# Patient Record
Sex: FEMALE | Race: WHITE
[De-identification: names, ages, dates, MRNs, and addresses within clinical notes are randomized per-mention and may not be internally consistent; named-entity substitution may affect disease eponyms.]

---

## 2017-04-06 ENCOUNTER — HOSPITAL (OUTPATIENT)
Dept: OTHER | Age: 82
End: 2017-04-06
Attending: OPHTHALMOLOGY

## 2017-04-20 ENCOUNTER — HOSPITAL (OUTPATIENT)
Dept: OTHER | Age: 82
End: 2017-04-20
Attending: OPHTHALMOLOGY

## 2021-05-17 ENCOUNTER — OFFICE VISIT (OUTPATIENT)
Dept: INTERNAL MEDICINE CLINIC | Facility: CLINIC | Age: 86
End: 2021-05-17
Payer: COMMERCIAL

## 2021-05-17 VITALS
SYSTOLIC BLOOD PRESSURE: 152 MMHG | DIASTOLIC BLOOD PRESSURE: 85 MMHG | BODY MASS INDEX: 29.63 KG/M2 | RESPIRATION RATE: 17 BRPM | WEIGHT: 161 LBS | HEIGHT: 62 IN | HEART RATE: 79 BPM

## 2021-05-17 DIAGNOSIS — E87.6 HYPOKALEMIA: Primary | ICD-10-CM

## 2021-05-17 DIAGNOSIS — R41.3 MEMORY IMPAIRMENT: ICD-10-CM

## 2021-05-17 PROBLEM — I10 ESSENTIAL HYPERTENSION: Status: ACTIVE | Noted: 2021-05-17

## 2021-05-17 PROCEDURE — 99203 OFFICE O/P NEW LOW 30 MIN: CPT | Performed by: INTERNAL MEDICINE

## 2021-05-17 PROCEDURE — 3077F SYST BP >= 140 MM HG: CPT | Performed by: INTERNAL MEDICINE

## 2021-05-17 PROCEDURE — 3079F DIAST BP 80-89 MM HG: CPT | Performed by: INTERNAL MEDICINE

## 2021-05-17 PROCEDURE — 3008F BODY MASS INDEX DOCD: CPT | Performed by: INTERNAL MEDICINE

## 2021-05-17 RX ORDER — CHLORHEXIDINE GLUCONATE 0.12 MG/ML
RINSE ORAL 2 TIMES DAILY
COMMUNITY
Start: 2021-05-11 | End: 2021-06-28

## 2021-05-17 RX ORDER — MELOXICAM 15 MG/1
TABLET ORAL
COMMUNITY
Start: 2018-10-26 | End: 2021-06-28

## 2021-05-17 RX ORDER — TRIAMTERENE AND HYDROCHLOROTHIAZIDE 37.5; 25 MG/1; MG/1
1 TABLET ORAL DAILY
COMMUNITY
Start: 2018-10-22 | End: 2021-07-14

## 2021-05-17 RX ORDER — PENICILLIN V POTASSIUM 500 MG/1
500 TABLET ORAL 4 TIMES DAILY
COMMUNITY
Start: 2021-05-11 | End: 2021-06-28

## 2021-05-18 ENCOUNTER — LAB ENCOUNTER (OUTPATIENT)
Dept: LAB | Age: 86
End: 2021-05-18
Attending: INTERNAL MEDICINE
Payer: MEDICARE

## 2021-05-18 DIAGNOSIS — E87.6 HYPOKALEMIA: ICD-10-CM

## 2021-05-18 DIAGNOSIS — R41.3 MEMORY IMPAIRMENT: ICD-10-CM

## 2021-05-18 PROCEDURE — 80053 COMPREHEN METABOLIC PANEL: CPT

## 2021-05-18 PROCEDURE — 36415 COLL VENOUS BLD VENIPUNCTURE: CPT

## 2021-05-18 PROCEDURE — 82607 VITAMIN B-12: CPT

## 2021-05-18 PROCEDURE — 84443 ASSAY THYROID STIM HORMONE: CPT

## 2021-05-18 NOTE — PROGRESS NOTES
HPI/Subjective:   Patient ID: Bonifacio Chu is a 80year old female. Resents to establish care, follow-up on hypertension    HPI  Patient moved to [facility] closer to her daughters.   She lives independently in [place], able to take care of yourself, gino Extraocular Movements: Extraocular movements intact. Conjunctiva/sclera: Conjunctivae normal.      Pupils: Pupils are equal, round, and reactive to light. Cardiovascular:      Rate and Rhythm: Normal rate and regular rhythm.       Heart sounds: No mu

## 2021-06-02 ENCOUNTER — LAB ENCOUNTER (OUTPATIENT)
Dept: LAB | Age: 86
End: 2021-06-02
Attending: INTERNAL MEDICINE
Payer: MEDICARE

## 2021-06-02 DIAGNOSIS — E87.1 HYPONATREMIA: ICD-10-CM

## 2021-06-02 PROCEDURE — 80048 BASIC METABOLIC PNL TOTAL CA: CPT

## 2021-06-02 PROCEDURE — 36415 COLL VENOUS BLD VENIPUNCTURE: CPT

## 2021-06-17 ENCOUNTER — LAB ENCOUNTER (OUTPATIENT)
Dept: LAB | Age: 86
End: 2021-06-17
Attending: INTERNAL MEDICINE
Payer: MEDICARE

## 2021-06-17 DIAGNOSIS — R89.9 ABNORMAL LABORATORY TEST RESULT: ICD-10-CM

## 2021-06-17 PROCEDURE — 36415 COLL VENOUS BLD VENIPUNCTURE: CPT

## 2021-06-17 PROCEDURE — 80048 BASIC METABOLIC PNL TOTAL CA: CPT

## 2021-06-23 ENCOUNTER — HOSPITAL ENCOUNTER (OUTPATIENT)
Dept: ULTRASOUND IMAGING | Age: 86
Discharge: HOME | End: 2021-06-23
Attending: INTERNAL MEDICINE
Payer: MEDICARE

## 2021-06-23 DIAGNOSIS — R79.89 ELEVATED SERUM CREATININE: ICD-10-CM

## 2021-06-23 PROCEDURE — 76775 US EXAM ABDO BACK WALL LIM: CPT | Performed by: INTERNAL MEDICINE

## 2021-06-28 ENCOUNTER — OFFICE VISIT (OUTPATIENT)
Dept: INTERNAL MEDICINE CLINIC | Facility: CLINIC | Age: 86
End: 2021-06-28
Payer: COMMERCIAL

## 2021-06-28 VITALS
DIASTOLIC BLOOD PRESSURE: 87 MMHG | HEART RATE: 76 BPM | BODY MASS INDEX: 28.52 KG/M2 | HEIGHT: 62 IN | WEIGHT: 155 LBS | SYSTOLIC BLOOD PRESSURE: 159 MMHG

## 2021-06-28 DIAGNOSIS — I10 ESSENTIAL HYPERTENSION: ICD-10-CM

## 2021-06-28 DIAGNOSIS — R41.3 MEMORY IMPAIRMENT: ICD-10-CM

## 2021-06-28 DIAGNOSIS — R19.00 PELVIC MASS IN FEMALE: ICD-10-CM

## 2021-06-28 DIAGNOSIS — Z00.00 PHYSICAL EXAM, ANNUAL: Primary | ICD-10-CM

## 2021-06-28 DIAGNOSIS — Z96.611 HISTORY OF ARTHROPLASTY OF RIGHT SHOULDER: ICD-10-CM

## 2021-06-28 DIAGNOSIS — I70.0 ATHEROSCLEROSIS OF AORTA (HCC): ICD-10-CM

## 2021-06-28 PROCEDURE — 3077F SYST BP >= 140 MM HG: CPT | Performed by: INTERNAL MEDICINE

## 2021-06-28 PROCEDURE — 99397 PER PM REEVAL EST PAT 65+ YR: CPT | Performed by: INTERNAL MEDICINE

## 2021-06-28 PROCEDURE — 96160 PT-FOCUSED HLTH RISK ASSMT: CPT | Performed by: INTERNAL MEDICINE

## 2021-06-28 PROCEDURE — G0439 PPPS, SUBSEQ VISIT: HCPCS | Performed by: INTERNAL MEDICINE

## 2021-06-28 PROCEDURE — 3079F DIAST BP 80-89 MM HG: CPT | Performed by: INTERNAL MEDICINE

## 2021-06-28 PROCEDURE — 3008F BODY MASS INDEX DOCD: CPT | Performed by: INTERNAL MEDICINE

## 2021-06-28 RX ORDER — METOPROLOL SUCCINATE 25 MG/1
25 TABLET, EXTENDED RELEASE ORAL DAILY
Qty: 90 TABLET | Refills: 0 | Status: SHIPPED | OUTPATIENT
Start: 2021-06-28 | End: 2021-09-22

## 2021-06-30 ENCOUNTER — TELEPHONE (OUTPATIENT)
Dept: INTERNAL MEDICINE CLINIC | Facility: CLINIC | Age: 86
End: 2021-06-30

## 2021-06-30 NOTE — TELEPHONE ENCOUNTER
Pt's daughter, Frank Weston (PAUL in place) states doctor felt a mass in pt's pelvic area when she was seen in the office on Monday 6/28/21 and a pelvic ultrasound was ordered.  Frank Weston states the earliest appt available for the ultrasound at any location was July 21st

## 2021-06-30 NOTE — TELEPHONE ENCOUNTER
Left message for the daughter that they can try to call scheduling and see if a new location for [facility] have openings for pelvic ultrasound to speed up testing

## 2021-07-02 NOTE — PROGRESS NOTES
HPI:   Niall Suggs is a 80year old female who presents for a MA (Medicare Advantage) [facility] (Once per calendar year). Reports that she has been feeling fair, she lives by herself able to take care of her needs, family visits her.   Patient is for AST 18 05/18/2021    ALT 17 05/18/2021    CA 9.7 06/17/2021    ALB 3.6 05/18/2021    TSH 0.766 05/18/2021    CREATSERUM 0.84 06/17/2021    GLU 97 06/17/2021        CBC  (most recent labs)   No results found for: WBC, HGB, PLT     ALLERGIES:   She is all Normal breath sounds. No wheezing or rhonchi. Abdominal:      General: Abdomen is flat. Palpations: Abdomen is soft. There is mass. Tenderness: There is no guarding or rebound.       Comments: Suprapubic mass size of the fist   Musculoskeletal: and exercise.     Follow-up in 2 weeks for blood pressure recheck    Marty Jernigan MD, 7/2/2021     General Health     In the past six months, have you lost more than 10 pounds without trying?: 2 - No  Has your appetite been poor?: No  How would you describ Sigmoidoscopy   Covered every 4 years -    Fecal Occult Blood Test Covered annually -   Bone Density Screening    Bone density screening    Covered every 2 years after age 72 if diagnosed with risk of osteoporosis or estrogen deficiency.     Covered yearly Drug Serum Conc Annually No results found for: DIGOXIN, DIG, VALP

## 2021-07-14 ENCOUNTER — PATIENT MESSAGE (OUTPATIENT)
Dept: INTERNAL MEDICINE CLINIC | Facility: CLINIC | Age: 86
End: 2021-07-14

## 2021-07-14 RX ORDER — TRIAMTERENE AND HYDROCHLOROTHIAZIDE 37.5; 25 MG/1; MG/1
1 TABLET ORAL DAILY
Qty: 90 TABLET | Refills: 1 | Status: SHIPPED | OUTPATIENT
Start: 2021-07-14 | End: 2021-09-23

## 2021-07-15 NOTE — TELEPHONE ENCOUNTER
From: Nayan Wen  To: Nayan Rand MD  Sent: 7/14/2021 6:42 PM CDT  Subject: Prescription Question    Will you please send a refill of my triamterene HCTZ to the [facility] on [address] in [facility].  Thank you

## 2021-07-21 ENCOUNTER — HOSPITAL ENCOUNTER (OUTPATIENT)
Dept: ULTRASOUND IMAGING | Age: 86
Discharge: HOME | End: 2021-07-21
Attending: INTERNAL MEDICINE
Payer: MEDICARE

## 2021-07-21 DIAGNOSIS — R19.00 PELVIC MASS IN FEMALE: ICD-10-CM

## 2021-07-21 PROCEDURE — 76856 US EXAM PELVIC COMPLETE: CPT | Performed by: INTERNAL MEDICINE

## 2021-07-21 PROCEDURE — 76830 TRANSVAGINAL US NON-OB: CPT | Performed by: INTERNAL MEDICINE

## 2021-07-31 ENCOUNTER — PATIENT MESSAGE (OUTPATIENT)
Dept: INTERNAL MEDICINE CLINIC | Facility: CLINIC | Age: 86
End: 2021-07-31

## 2021-08-01 ENCOUNTER — TELEPHONE (OUTPATIENT)
Dept: INTERNAL MEDICINE CLINIC | Facility: CLINIC | Age: 86
End: 2021-08-01

## 2021-08-05 ENCOUNTER — TELEPHONE (OUTPATIENT)
Dept: INTERNAL MEDICINE CLINIC | Facility: CLINIC | Age: 86
End: 2021-08-05

## 2021-08-05 NOTE — TELEPHONE ENCOUNTER
Background, Mychart 8/5/2021 4:31 PM T    [phone]. Thank you    ----- Message -----  From: Debi Rodríguez MD  Sent: 8/1/21 17:14  To: Carla Littlejohn  Subject: RE: Test Results Question    I would like to speak to you regarding next step.  Please give

## 2021-08-06 NOTE — TELEPHONE ENCOUNTER
Please approve for patient CT pelvis with contrast, pelvic mass palpated on exam in the office, negative pelvic ultrasound

## 2021-08-06 NOTE — TELEPHONE ENCOUNTER
Spoke to daughter, advised that I recommend CT pelvis will place referral requires approval from insurance most likely, will wait for approval

## 2021-08-20 ENCOUNTER — HOSPITAL ENCOUNTER (OUTPATIENT)
Dept: CT IMAGING | Age: 86
Discharge: HOME | End: 2021-08-20
Attending: INTERNAL MEDICINE
Payer: MEDICARE

## 2021-08-20 DIAGNOSIS — R19.00 PELVIC MASS: ICD-10-CM

## 2021-08-20 LAB — CREAT BLD-MCNC: 1 MG/DL

## 2021-08-20 PROCEDURE — 82565 ASSAY OF CREATININE: CPT

## 2021-08-20 PROCEDURE — 72193 CT PELVIS W/DYE: CPT | Performed by: INTERNAL MEDICINE

## 2021-09-16 ENCOUNTER — OFFICE VISIT (OUTPATIENT)
Dept: ORTHOPEDICS CLINIC | Facility: CLINIC | Age: 86
End: 2021-09-16
Payer: COMMERCIAL

## 2021-09-16 ENCOUNTER — HOSPITAL ENCOUNTER (OUTPATIENT)
Dept: GENERAL RADIOLOGY | Facility: HOSPITAL | Age: 86
Discharge: HOME | End: 2021-09-16
Attending: ORTHOPAEDIC SURGERY
Payer: MEDICARE

## 2021-09-16 VITALS — DIASTOLIC BLOOD PRESSURE: 68 MMHG | HEART RATE: 67 BPM | SYSTOLIC BLOOD PRESSURE: 128 MMHG

## 2021-09-16 DIAGNOSIS — M25.562 PAIN IN BOTH KNEES, UNSPECIFIED CHRONICITY: ICD-10-CM

## 2021-09-16 DIAGNOSIS — M25.561 PAIN IN BOTH KNEES, UNSPECIFIED CHRONICITY: ICD-10-CM

## 2021-09-16 DIAGNOSIS — M17.0 PRIMARY OSTEOARTHRITIS OF BOTH KNEES: Primary | ICD-10-CM

## 2021-09-16 PROCEDURE — 73562 X-RAY EXAM OF KNEE 3: CPT | Performed by: ORTHOPAEDIC SURGERY

## 2021-09-16 PROCEDURE — 3078F DIAST BP <80 MM HG: CPT | Performed by: ORTHOPAEDIC SURGERY

## 2021-09-16 PROCEDURE — 99204 OFFICE O/P NEW MOD 45 MIN: CPT | Performed by: ORTHOPAEDIC SURGERY

## 2021-09-16 PROCEDURE — 20610 DRAIN/INJ JOINT/BURSA W/O US: CPT | Performed by: ORTHOPAEDIC SURGERY

## 2021-09-16 PROCEDURE — 3074F SYST BP LT 130 MM HG: CPT | Performed by: ORTHOPAEDIC SURGERY

## 2021-09-16 RX ORDER — TRIAMCINOLONE ACETONIDE 40 MG/ML
40 INJECTION, SUSPENSION INTRA-ARTICULAR; INTRAMUSCULAR
Status: COMPLETED | OUTPATIENT
Start: 2021-09-16 | End: 2021-09-16

## 2021-09-16 RX ADMIN — TRIAMCINOLONE ACETONIDE 40 MG: 40 INJECTION, SUSPENSION INTRA-ARTICULAR; INTRAMUSCULAR at 14:07:00

## 2021-09-16 RX ADMIN — TRIAMCINOLONE ACETONIDE 40 MG: 40 INJECTION, SUSPENSION INTRA-ARTICULAR; INTRAMUSCULAR at 14:08:00

## 2021-09-16 NOTE — PROGRESS NOTES
NURSING INTAKE COMMENTS: Patient presents with:  Consult: Bilateral knee pain, 6/10 pain scale      HPI: This 80year old female presents today with complaints of bilateral knee pain. She has had symptoms for a number of years. No recent injury.   Her selwyn stomach ulcers  : denies dysuria, hematuria, incontinence, increased frequency, urgency, difficulty urinating  MUSCULOSKELETAL: denies musculoskeletal complaints other than in HPI  NEURO: denies numbness, tingling, weakness, balance issues, dizziness, me Evaluation of the gastrointestinal tract is limited without enteric contrast.  The visualized bowel loops are nondilated. The appendix is normal. URINARY BLADDER: No visible calculus or focal wall thickening. PELVIC NODES: No lymphadenopathy.    PELVIC O mild-to-moderate right and severe left patellofemoral joint space narrowing. Tricompartmental marginal osteophyte formation. SOFT TISSUES: Negative. No visible soft tissue swelling. EFFUSION: None visible. OTHER: Negative.          CONCLUSION:   No acute f

## 2021-09-16 NOTE — PROCEDURES
Hakeem up two syringes with 3ml of 0.5% marcaine and 2ml 1% lidocaine and 1ml of 40mg kenalog for cortisone injections.

## 2021-09-22 RX ORDER — METOPROLOL SUCCINATE 25 MG/1
25 TABLET, EXTENDED RELEASE ORAL DAILY
Qty: 90 TABLET | Refills: 1 | Status: SHIPPED | OUTPATIENT
Start: 2021-09-22 | End: 2021-11-12

## 2021-09-22 NOTE — TELEPHONE ENCOUNTER
Refill passed per [facility] protocol.       Requested Prescriptions   Pending Prescriptions Disp Refills    METOPROLOL SUCCINATE 25 MG Oral Tablet 24 Hr [Pharmacy Med Name: METOPROLOL ER SUCCINATE 25MG TABS] 90 tablet 0     Sig: TAKE 1 TABLET(25 MG) BY MOUTH DAILY        Hypertensive Medications Protocol Passed - 9/22/2021 11:43 AM        Passed - CMP or BMP in past 12 months        Passed - Appointment in past 6 or next 3 months        Passed - GFR Non- > 50     Lab Results   Component Value Date    GFRNAA 51 (L) 08/20/2021                        Future Appointments         Provider Department Appt Notes    In 2 weeks Quique Grhaam, PT GOLDIE Azevedo [place]    In 1 month [facility] Clemons & Gina Daniels[address] in [place]     In 1 month Modesta Clemons & [address] in [place]     In 1 month [facility] [address] [address] in [place]     In 1 month [facility] [address] in [place]     In 1 month [facility] [address] in [place]     In 1 month Modesta Clemons & [address] in [place]     In 1 month Modesta Clemons & [address] in [address] [place] [address] Road Visits              6 days ago Primary osteoarthritis of both [address] for Bautista Ladd MD    Office Visit    2 months ago Physical exam, annual    [facility], [facility], Alex Fontanez MD    Office Visit    4 months ago Hypokalemia    [address] [facility], Alex Fontanez MD    Office Visit

## 2021-09-23 NOTE — TELEPHONE ENCOUNTER
Left message to call back for daughter Sabrina Espitia     Last fill = 7/14  For 90 days with one additional fill

## 2021-09-24 RX ORDER — TRIAMTERENE AND HYDROCHLOROTHIAZIDE 37.5; 25 MG/1; MG/1
1 TABLET ORAL DAILY
Qty: 90 TABLET | Refills: 1 | Status: SHIPPED | OUTPATIENT
Start: 2021-09-24 | End: 2021-11-12

## 2021-10-07 ENCOUNTER — PATIENT MESSAGE (OUTPATIENT)
Dept: INTERNAL MEDICINE CLINIC | Facility: CLINIC | Age: 86
End: 2021-10-07

## 2021-10-07 NOTE — TELEPHONE ENCOUNTER
From: Margaret Art  To: Lien Kang MD  Sent: 10/7/2021 3:18 PM CDT  Subject: Independent Living form from [facility] will be receiving a form to give your approval for me to move into independent [facility] [place] [facility] [place] [address].  The facility

## 2021-10-08 ENCOUNTER — TELEPHONE (OUTPATIENT)
Dept: PHYSICAL THERAPY | Facility: HOSPITAL | Age: 86
End: 2021-10-08

## 2021-10-11 ENCOUNTER — APPOINTMENT (OUTPATIENT)
Dept: PHYSICAL THERAPY | Age: 86
End: 2021-10-11
Attending: ORTHOPAEDIC SURGERY
Payer: MEDICARE

## 2021-10-25 ENCOUNTER — APPOINTMENT (OUTPATIENT)
Dept: PHYSICAL THERAPY | Age: 86
End: 2021-10-25
Attending: ORTHOPAEDIC SURGERY
Payer: MEDICARE

## 2021-10-27 ENCOUNTER — NURSE TRIAGE (OUTPATIENT)
Dept: INTERNAL MEDICINE CLINIC | Facility: CLINIC | Age: 86
End: 2021-10-27

## 2021-10-27 NOTE — TELEPHONE ENCOUNTER
Action Requested: Summary for Provider     []  Critical Lab, Recommendations Needed  [x] Need Additional Advice  []   FYI    []   Need Orders  [] Need Medications Sent to Pharmacy  []  Other     SUMMARY: Patient's daughter, (on PAUL) calling without patient

## 2021-10-27 NOTE — TELEPHONE ENCOUNTER
Fine to use RES24, IF  MOTHER LOOKS  SICK SHE HSOULD TAKE HER TO ER or immidiate care  and  not  waiting till next week

## 2021-10-27 NOTE — TELEPHONE ENCOUNTER
Daughter Vel Steele returned call, she's been with patient a few hours, no bouts of diarrhea, no concerns of blood or mucus, has been drinking Pedialyte, was able to eat applesauce and rice. Will give yogurt with probiotic.  Will monitor over next few days, if be

## 2021-10-28 ENCOUNTER — APPOINTMENT (OUTPATIENT)
Dept: PHYSICAL THERAPY | Age: 86
End: 2021-10-28
Attending: ORTHOPAEDIC SURGERY
Payer: MEDICARE

## 2021-11-01 ENCOUNTER — APPOINTMENT (OUTPATIENT)
Dept: PHYSICAL THERAPY | Age: 86
End: 2021-11-01
Attending: ORTHOPAEDIC SURGERY
Payer: MEDICARE

## 2021-11-04 ENCOUNTER — APPOINTMENT (OUTPATIENT)
Dept: PHYSICAL THERAPY | Age: 86
End: 2021-11-04
Attending: ORTHOPAEDIC SURGERY
Payer: MEDICARE

## 2021-11-09 ENCOUNTER — APPOINTMENT (OUTPATIENT)
Dept: PHYSICAL THERAPY | Age: 86
End: 2021-11-09
Attending: ORTHOPAEDIC SURGERY
Payer: MEDICARE

## 2021-11-11 ENCOUNTER — APPOINTMENT (OUTPATIENT)
Dept: PHYSICAL THERAPY | Age: 86
End: 2021-11-11
Attending: ORTHOPAEDIC SURGERY
Payer: MEDICARE

## 2021-11-12 RX ORDER — METOPROLOL SUCCINATE 25 MG/1
25 TABLET, EXTENDED RELEASE ORAL DAILY
Qty: 90 TABLET | Refills: 1 | Status: SHIPPED | OUTPATIENT
Start: 2021-11-12 | End: 2022-03-13

## 2021-11-12 RX ORDER — TRIAMTERENE AND HYDROCHLOROTHIAZIDE 37.5; 25 MG/1; MG/1
1 TABLET ORAL DAILY
Qty: 90 TABLET | Refills: 1 | Status: SHIPPED | OUTPATIENT
Start: 2021-11-12 | End: 2022-03-13

## 2021-11-12 NOTE — TELEPHONE ENCOUNTER
Refill passed per [facility] protocol. Requested Prescriptions   Pending Prescriptions Disp Refills    metoprolol succinate 25 MG Oral Tablet 24 Hr 90 tablet 1     Sig: Take 1 tablet (25 mg total) by mouth daily.         Hypertensive Medications Protocol Passed - 11/12/2021 12:59 PM        Passed - CMP or BMP in past 12 months        Passed - Appointment in past 6 or next 3 months        Passed - GFR Non- > 50     Lab Results   Component Value Date    GFRNAA 51 (L) 08/20/2021                           Recent Outpatient Visits              1 month ago Primary osteoarthritis of both knees    [facility] for Deuce Tamayo MD    Office Visit    4 months ago Physical exam, annual    [facility] [place], [facility], Doc Bajwa MD    Office Visit    5 months ago Hypokalemia    [address] [facility], Dolores Bernal MD    Office Visit

## 2021-11-12 NOTE — TELEPHONE ENCOUNTER
Refill passed per [facility] protocol. Requested Prescriptions   Pending Prescriptions Disp Refills    Triamterene-HCTZ 37.5-25 MG Oral Tab 90 tablet 1     Sig: Take 1 tablet by mouth daily.         Hypertensive Medications Protocol Passed - 11/12/2021 12:58 PM        Passed - CMP or BMP in past 12 months        Passed - Appointment in past 6 or next 3 months        Passed - GFR Non- > 50     Lab Results   Component Value Date    GFRNAA 51 (L) 08/20/2021                           Recent Outpatient Visits              1 month ago Primary osteoarthritis of both knees    [facility] for Kaela Simon MD    Office Visit    4 months ago Physical exam, annual    [facility], [facility], Char Osorio MD    Office Visit    5 months ago Hypokalemia    [facility], Dolores Cuba MD    Office Visit

## 2021-11-15 ENCOUNTER — APPOINTMENT (OUTPATIENT)
Dept: PHYSICAL THERAPY | Age: 86
End: 2021-11-15
Attending: ORTHOPAEDIC SURGERY
Payer: MEDICARE

## 2022-03-14 RX ORDER — TRIAMTERENE AND HYDROCHLOROTHIAZIDE 37.5; 25 MG/1; MG/1
1 TABLET ORAL DAILY
Qty: 90 TABLET | Refills: 1 | Status: SHIPPED | OUTPATIENT
Start: 2022-03-14

## 2022-03-14 RX ORDER — METOPROLOL SUCCINATE 25 MG/1
25 TABLET, EXTENDED RELEASE ORAL DAILY
Qty: 90 TABLET | Refills: 1 | Status: SHIPPED | OUTPATIENT
Start: 2022-03-14

## 2022-03-14 NOTE — TELEPHONE ENCOUNTER
Refill passed per [address] [facility] protocol. Requested Prescriptions   Pending Prescriptions Disp Refills    Triamterene-HCTZ 37.5-25 MG Oral Tab 90 tablet 1     Sig: Take 1 tablet by mouth daily.         Hypertensive Medications Protocol Passed - 3/13/2022  4:51 PM        Passed - CMP or BMP in past 12 months        Passed - Appointment in past 6 or next 3 months        Passed - GFR Non- > 50     Lab Results   Component Value Date    GFRNAA 51 (L) 08/20/2021                        Recent Outpatient Visits              5 months ago Primary osteoarthritis of both knees    [facility], Winter Martinez Marylynn Asal, MD    Office Visit    8 months ago Physical exam, annual    Sha Trivedi [place], MD    Office Visit    10 months ago Hypokalemia    [address], Sha Marcano [place], MD    Office Visit            Future Appointments         Provider Department Appt Notes    In 2 months Curtis Fisher MD [address] [facility] annual

## 2022-03-14 NOTE — TELEPHONE ENCOUNTER
Refill passed per [address] [facility] protocol. Requested Prescriptions   Pending Prescriptions Disp Refills    metoprolol succinate ER 25 MG Oral Tablet 24 Hr 90 tablet 1     Sig: Take 1 tablet (25 mg total) by mouth daily.         Hypertensive Medications Protocol Passed - 3/13/2022  4:52 PM        Passed - CMP or BMP in past 12 months        Passed - Appointment in past 6 or next 3 months        Passed - GFR Non- > 50     Lab Results   Component Value Date    GFRNAA 51 (L) 08/20/2021                        Recent Outpatient Visits              5 months ago Primary osteoarthritis of both knees    [facility], [address], [place], Kenyatta Lau MD    Office Visit    8 months ago Physical exam, annual    [address] [facility], Dolores Reyez MD    Office Visit    10 months ago Hypokalemia    [address],  Rowan Min [place], MD    Office Visit            Future Appointments         Provider Department Appt Notes    In 2 months Estefania Rojo MD [address]  Inocencio Foreman [facility] annual

## 2022-05-04 ENCOUNTER — HOSPITAL ENCOUNTER (EMERGENCY)
Facility: HOSPITAL | Age: 87
Discharge: HOME | End: 2022-05-04
Attending: EMERGENCY MEDICINE
Payer: MEDICARE

## 2022-05-04 ENCOUNTER — APPOINTMENT (OUTPATIENT)
Dept: CT IMAGING | Facility: HOSPITAL | Age: 87
End: 2022-05-04
Attending: EMERGENCY MEDICINE
Payer: MEDICARE

## 2022-05-04 VITALS
WEIGHT: 150 LBS | HEART RATE: 76 BPM | HEIGHT: 65 IN | RESPIRATION RATE: 16 BRPM | BODY MASS INDEX: 24.99 KG/M2 | DIASTOLIC BLOOD PRESSURE: 70 MMHG | OXYGEN SATURATION: 96 % | TEMPERATURE: 99 F | SYSTOLIC BLOOD PRESSURE: 146 MMHG

## 2022-05-04 DIAGNOSIS — S16.1XXA STRAIN OF NECK MUSCLE, INITIAL ENCOUNTER: ICD-10-CM

## 2022-05-04 DIAGNOSIS — S00.03XA CONTUSION OF SCALP, INITIAL ENCOUNTER: ICD-10-CM

## 2022-05-04 DIAGNOSIS — S09.8XXA BLUNT HEAD TRAUMA, INITIAL ENCOUNTER: Primary | ICD-10-CM

## 2022-05-04 PROCEDURE — 70450 CT HEAD/BRAIN W/O DYE: CPT | Performed by: EMERGENCY MEDICINE

## 2022-05-04 PROCEDURE — 72125 CT NECK SPINE W/O DYE: CPT | Performed by: EMERGENCY MEDICINE

## 2022-05-04 PROCEDURE — 99284 EMERGENCY DEPT VISIT MOD MDM: CPT

## 2022-05-04 NOTE — ED QUICK NOTES
The [facility]in [facility] called and notified of pt's discharge and return back.

## 2022-05-04 NOTE — ED INITIAL ASSESSMENT (HPI)
Pt arrives via EMS from The [address] [facility] after the pt had a fall overnight. Per EMS, pt does not remember the fall and pt has a hx of Alzheimers. Pt is c/o mild upper R back pain from the fall. EMS noted a skin tear to pt's R elbow that is dressed with a bandage, bleeding is controlled. Pt is currently A&Ox4/4.

## 2022-05-06 ENCOUNTER — TELEPHONE (OUTPATIENT)
Dept: INTERNAL MEDICINE CLINIC | Facility: CLINIC | Age: 87
End: 2022-05-06

## 2022-05-06 NOTE — TELEPHONE ENCOUNTER
Spoke to daughter, Dillon Mendez,  (on PAUL, verified patient's name and ). She would like patient to come in to see Dr. Birtha Claude sooner than . Her mom was in the ER on . Also, she said her mom has lost approximately 40 lbs in the last year and she needs some paperwork filled out to get her into an assisted living facility. There was nothing next week that writer could book. Relayed message would be sent to Dr. Birtha Claude. Dr. Birtha Claude, please advise if res 24 can be used next week.

## 2022-05-06 NOTE — TELEPHONE ENCOUNTER
I can see patient next week useRS24 for her appointment probably Wednesday Friday Problem: Patient Care Overview  Goal: Plan of Care Review  Outcome: Ongoing (interventions implemented as appropriate)  Plan of care discuss with patient. No questions at present. Pt to remain NPO. AAOx3. Bedrest due to right ankle repair. Pain medication provided as prescribed for pain. No distress noted. Remains on telemetry and cont pulse ox monitoring. Toileting offered. Call light in reach, bed low, with instructions to call if needed.

## 2022-05-09 NOTE — TELEPHONE ENCOUNTER
1st attempt/left voice mail message for daughter to call back. Please, see doctors message below when the call is returned.

## 2022-05-11 ENCOUNTER — LAB ENCOUNTER (OUTPATIENT)
Dept: LAB | Age: 87
End: 2022-05-11
Attending: INTERNAL MEDICINE
Payer: MEDICARE

## 2022-05-11 ENCOUNTER — HOSPITAL ENCOUNTER (OUTPATIENT)
Dept: GENERAL RADIOLOGY | Age: 87
Discharge: HOME | End: 2022-05-11
Attending: INTERNAL MEDICINE
Payer: MEDICARE

## 2022-05-11 ENCOUNTER — OFFICE VISIT (OUTPATIENT)
Dept: INTERNAL MEDICINE CLINIC | Facility: CLINIC | Age: 87
End: 2022-05-11
Payer: COMMERCIAL

## 2022-05-11 VITALS
HEART RATE: 73 BPM | WEIGHT: 117 LBS | HEIGHT: 65 IN | BODY MASS INDEX: 19.49 KG/M2 | DIASTOLIC BLOOD PRESSURE: 71 MMHG | SYSTOLIC BLOOD PRESSURE: 109 MMHG

## 2022-05-11 DIAGNOSIS — I10 PRIMARY HYPERTENSION: ICD-10-CM

## 2022-05-11 DIAGNOSIS — N39.0 URINARY TRACT INFECTION WITHOUT HEMATURIA, SITE UNSPECIFIED: ICD-10-CM

## 2022-05-11 DIAGNOSIS — Z11.1 SCREENING-PULMONARY TB: ICD-10-CM

## 2022-05-11 DIAGNOSIS — R63.4 WEIGHT LOSS: ICD-10-CM

## 2022-05-11 DIAGNOSIS — L89.311: ICD-10-CM

## 2022-05-11 DIAGNOSIS — R53.1 WEAKNESS GENERALIZED: ICD-10-CM

## 2022-05-11 DIAGNOSIS — R53.1 WEAKNESS GENERALIZED: Primary | ICD-10-CM

## 2022-05-11 LAB
ALBUMIN SERPL-MCNC: 3.3 G/DL (ref 3.4–5)
ALBUMIN/GLOB SERPL: 1.1 {RATIO} (ref 1–2)
ALP LIVER SERPL-CCNC: 62 U/L
ALT SERPL-CCNC: 24 U/L
ANION GAP SERPL CALC-SCNC: 8 MMOL/L (ref 0–18)
AST SERPL-CCNC: 26 U/L (ref 15–37)
BILIRUB SERPL-MCNC: 0.4 MG/DL (ref 0.1–2)
BUN BLD-MCNC: 21 MG/DL (ref 7–18)
BUN/CREAT SERPL: 21.2 (ref 10–20)
CALCIUM BLD-MCNC: 9 MG/DL (ref 8.5–10.1)
CHLORIDE SERPL-SCNC: 86 MMOL/L (ref 98–112)
CO2 SERPL-SCNC: 32 MMOL/L (ref 21–32)
CREAT BLD-MCNC: 0.99 MG/DL
FASTING STATUS PATIENT QL REPORTED: NO
GLOBULIN PLAS-MCNC: 3 G/DL (ref 2.8–4.4)
GLUCOSE BLD-MCNC: 101 MG/DL (ref 70–99)
OSMOLALITY SERPL CALC.SUM OF ELEC: 265 MOSM/KG (ref 275–295)
POTASSIUM SERPL-SCNC: 3.3 MMOL/L (ref 3.5–5.1)
PROT SERPL-MCNC: 6.3 G/DL (ref 6.4–8.2)
SODIUM SERPL-SCNC: 126 MMOL/L (ref 136–145)
TSI SER-ACNC: 1.68 MIU/ML (ref 0.36–3.74)

## 2022-05-11 PROCEDURE — 36415 COLL VENOUS BLD VENIPUNCTURE: CPT

## 2022-05-11 PROCEDURE — 85060 BLOOD SMEAR INTERPRETATION: CPT

## 2022-05-11 PROCEDURE — 84443 ASSAY THYROID STIM HORMONE: CPT

## 2022-05-11 PROCEDURE — 3078F DIAST BP <80 MM HG: CPT | Performed by: INTERNAL MEDICINE

## 2022-05-11 PROCEDURE — 85007 BL SMEAR W/DIFF WBC COUNT: CPT

## 2022-05-11 PROCEDURE — 93010 ELECTROCARDIOGRAM REPORT: CPT | Performed by: INTERNAL MEDICINE

## 2022-05-11 PROCEDURE — 99214 OFFICE O/P EST MOD 30 MIN: CPT | Performed by: INTERNAL MEDICINE

## 2022-05-11 PROCEDURE — 86480 TB TEST CELL IMMUN MEASURE: CPT

## 2022-05-11 PROCEDURE — 80053 COMPREHEN METABOLIC PANEL: CPT

## 2022-05-11 PROCEDURE — 3074F SYST BP LT 130 MM HG: CPT | Performed by: INTERNAL MEDICINE

## 2022-05-11 PROCEDURE — 93005 ELECTROCARDIOGRAM TRACING: CPT

## 2022-05-11 PROCEDURE — 85025 COMPLETE CBC W/AUTO DIFF WBC: CPT

## 2022-05-11 PROCEDURE — 71046 X-RAY EXAM CHEST 2 VIEWS: CPT | Performed by: INTERNAL MEDICINE

## 2022-05-11 PROCEDURE — 3008F BODY MASS INDEX DOCD: CPT | Performed by: INTERNAL MEDICINE

## 2022-05-11 PROCEDURE — 85027 COMPLETE CBC AUTOMATED: CPT

## 2022-05-12 ENCOUNTER — LAB ENCOUNTER (OUTPATIENT)
Dept: LAB | Age: 87
End: 2022-05-12
Attending: INTERNAL MEDICINE
Payer: MEDICARE

## 2022-05-12 DIAGNOSIS — N39.0 URINARY TRACT INFECTION WITHOUT HEMATURIA, SITE UNSPECIFIED: ICD-10-CM

## 2022-05-12 LAB
BASOPHILS # BLD: 0.12 X10(3) UL (ref 0–0.2)
BASOPHILS NFR BLD: 1 %
BILIRUB UR QL: NEGATIVE
COLOR UR: YELLOW
DEPRECATED RDW RBC AUTO: 42.6 FL (ref 35.1–46.3)
EOSINOPHIL # BLD: 0 X10(3) UL (ref 0–0.7)
EOSINOPHIL NFR BLD: 0 %
ERYTHROCYTE [DISTWIDTH] IN BLOOD BY AUTOMATED COUNT: 13.3 % (ref 11–15)
GLUCOSE UR-MCNC: NEGATIVE MG/DL
HCT VFR BLD AUTO: 36.7 %
HGB BLD-MCNC: 12.5 G/DL
KETONES UR-MCNC: NEGATIVE MG/DL
LYMPHOCYTES NFR BLD: 47 %
LYMPHOCYTES NFR BLD: 6.49 X10(3) UL (ref 1–4)
MCH RBC QN AUTO: 29.8 PG (ref 26–34)
MCHC RBC AUTO-ENTMCNC: 34.1 G/DL (ref 31–37)
MCV RBC AUTO: 87.6 FL
MONOCYTES # BLD: 0.35 X10(3) UL (ref 0.1–1)
MONOCYTES NFR BLD: 3 %
MORPHOLOGY: NORMAL
NEUTROPHILS # BLD AUTO: 6.17 X10 (3) UL (ref 1.5–7.7)
NEUTROPHILS NFR BLD: 41 %
NEUTS HYPERSEG # BLD: 4.84 X10(3) UL (ref 1.5–7.7)
NITRITE UR QL STRIP.AUTO: POSITIVE
PH UR: 8 [PH] (ref 5–8)
PLATELET # BLD AUTO: 289 10(3)UL (ref 150–450)
PLATELET MORPHOLOGY: NORMAL
PROT UR-MCNC: NEGATIVE MG/DL
RBC # BLD AUTO: 4.19 X10(6)UL
SP GR UR STRIP: 1.01 (ref 1–1.03)
TOTAL CELLS COUNTED BLD: 100
UROBILINOGEN UR STRIP-ACNC: <2
VARIANT LYMPHS NFR BLD MANUAL: 8 %
VIT C UR-MCNC: NEGATIVE MG/DL
WBC # BLD AUTO: 11.8 X10(3) UL (ref 4–11)
WBC #/AREA URNS AUTO: >50 /HPF
WBC CLUMPS UR QL AUTO: PRESENT /HPF

## 2022-05-12 PROCEDURE — 81001 URINALYSIS AUTO W/SCOPE: CPT

## 2022-05-12 PROCEDURE — 87186 SC STD MICRODIL/AGAR DIL: CPT

## 2022-05-12 PROCEDURE — 87077 CULTURE AEROBIC IDENTIFY: CPT

## 2022-05-12 PROCEDURE — 87086 URINE CULTURE/COLONY COUNT: CPT

## 2022-05-13 ENCOUNTER — PATIENT MESSAGE (OUTPATIENT)
Dept: INTERNAL MEDICINE CLINIC | Facility: CLINIC | Age: 87
End: 2022-05-13

## 2022-05-13 LAB
M TB IFN-G CD4+ T-CELLS BLD-ACNC: 0.13 IU/ML
M TB TUBERC IFN-G BLD QL: NEGATIVE
M TB TUBERC IGNF/MITOGEN IGNF CONTROL: >10 IU/ML
QFT TB1 AG MINUS NIL: 0.03 IU/ML
QFT TB2 AG MINUS NIL: 0.01 IU/ML

## 2022-05-14 ENCOUNTER — TELEPHONE (OUTPATIENT)
Dept: INTERNAL MEDICINE CLINIC | Facility: CLINIC | Age: 87
End: 2022-05-14

## 2022-05-14 ENCOUNTER — PATIENT MESSAGE (OUTPATIENT)
Dept: INTERNAL MEDICINE CLINIC | Facility: CLINIC | Age: 87
End: 2022-05-14

## 2022-05-14 NOTE — TELEPHONE ENCOUNTER
From: Clemente Hooper  To: Stanley Romero MD  Sent: 5/13/2022 9:29 PM CDT  Subject: Test results    I left a message this morning to discuss test results. Still waiting for a call back.  [phone]

## 2022-05-14 NOTE — TELEPHONE ENCOUNTER
OFFICE STAFF=please assist,thanks. From: [facility]  To: Jaya Dickinson MD  Sent: 5/13/2022  9:29 PM CDT  Subject: Test results    I left a message this morning to discuss test results. Still waiting for a call back.  [phone]

## 2022-05-14 NOTE — TELEPHONE ENCOUNTER
Please help to find home health agency for this patient manage care Medicare HMO plan, I created letter requesting services, my note is available

## 2022-05-15 PROBLEM — F01.50 VASCULAR DEMENTIA WITHOUT BEHAVIORAL DISTURBANCE (HCC): Status: ACTIVE | Noted: 2022-05-15

## 2022-05-16 ENCOUNTER — TELEPHONE (OUTPATIENT)
Dept: INTERNAL MEDICINE CLINIC | Facility: CLINIC | Age: 87
End: 2022-05-16

## 2022-05-16 NOTE — TELEPHONE ENCOUNTER
Informed by NK to contact [facility], and ask them if they're able to see patient. Was informed they should be able to see patient but will need office notes, order, and demographics first before they determine. Faxed all information to [phone]. Rec'd fax confirmation. none

## 2022-05-17 ENCOUNTER — LAB ENCOUNTER (OUTPATIENT)
Dept: LAB | Age: 87
End: 2022-05-17
Attending: INTERNAL MEDICINE
Payer: MEDICARE

## 2022-05-17 ENCOUNTER — TELEPHONE (OUTPATIENT)
Dept: INTERNAL MEDICINE CLINIC | Facility: CLINIC | Age: 87
End: 2022-05-17

## 2022-05-17 DIAGNOSIS — E87.1 HYPONATREMIA: ICD-10-CM

## 2022-05-17 LAB
ANION GAP SERPL CALC-SCNC: 8 MMOL/L (ref 0–18)
BUN BLD-MCNC: 15 MG/DL (ref 7–18)
BUN/CREAT SERPL: 15.3 (ref 10–20)
CALCIUM BLD-MCNC: 9.4 MG/DL (ref 8.5–10.1)
CHLORIDE SERPL-SCNC: 93 MMOL/L (ref 98–112)
CO2 SERPL-SCNC: 33 MMOL/L (ref 21–32)
CREAT BLD-MCNC: 0.98 MG/DL
FASTING STATUS PATIENT QL REPORTED: NO
GLUCOSE BLD-MCNC: 112 MG/DL (ref 70–99)
OSMOLALITY SERPL CALC.SUM OF ELEC: 280 MOSM/KG (ref 275–295)
POTASSIUM SERPL-SCNC: 3.4 MMOL/L (ref 3.5–5.1)
SODIUM SERPL-SCNC: 134 MMOL/L (ref 136–145)

## 2022-05-17 PROCEDURE — 36415 COLL VENOUS BLD VENIPUNCTURE: CPT

## 2022-05-17 PROCEDURE — 80048 BASIC METABOLIC PNL TOTAL CA: CPT

## 2022-05-17 NOTE — TELEPHONE ENCOUNTER
Spoke to daughter, home health starting services right now, will see patient tomorrow we will follow-up with the labs, advised to use Tylenol for restless leg symptoms for now.

## 2022-05-17 NOTE — TELEPHONE ENCOUNTER
Patient's daughter calling to check to see if paperwork has been faxed to [facility].

## 2022-05-17 NOTE — TELEPHONE ENCOUNTER
Patient's daughter states she has not given patient the metoprolol succinate 25 mg oral tablet since Saturday 5/14. Blood pressure was 84/50 Patient has already stopped taking the triamterene-hydrochlorothiazide 37.5-25 mg oral tab. Daughter states blood pressures without any blood pressure medication have been ranging 102-119/62-77. Patient also has started having restless legs that started recently, mainly starting in the late afternoon. Patient's legs start moving around, 2 nights ago hard to sleep because of the restless legs. Daughter asking if medication should be prescribed for this. Patient has an appointment tomorrow.     Future Appointments   Date Time Provider Yong Pedraza   5/18/2022  1:20 PM Anila Blake MD [facility] [place]   5/25/2022  2:30 PM Vickie Pike DPM [facility] [place]   6/16/2022  1:45 PM Nitesh Taylor MD [facility]

## 2022-05-18 ENCOUNTER — OFFICE VISIT (OUTPATIENT)
Dept: INTERNAL MEDICINE CLINIC | Facility: CLINIC | Age: 87
End: 2022-05-18
Payer: COMMERCIAL

## 2022-05-18 ENCOUNTER — TELEPHONE (OUTPATIENT)
Dept: INTERNAL MEDICINE CLINIC | Facility: CLINIC | Age: 87
End: 2022-05-18

## 2022-05-18 VITALS
DIASTOLIC BLOOD PRESSURE: 83 MMHG | BODY MASS INDEX: 20.99 KG/M2 | HEART RATE: 73 BPM | HEIGHT: 65 IN | SYSTOLIC BLOOD PRESSURE: 142 MMHG | WEIGHT: 126 LBS

## 2022-05-18 DIAGNOSIS — E87.1 HYPONATREMIA: ICD-10-CM

## 2022-05-18 DIAGNOSIS — I10 PRIMARY HYPERTENSION: ICD-10-CM

## 2022-05-18 DIAGNOSIS — M25.562 CHRONIC PAIN OF BOTH KNEES: ICD-10-CM

## 2022-05-18 DIAGNOSIS — F01.50 VASCULAR DEMENTIA WITHOUT BEHAVIORAL DISTURBANCE (HCC): ICD-10-CM

## 2022-05-18 DIAGNOSIS — Z00.00 PHYSICAL EXAM, ANNUAL: Primary | ICD-10-CM

## 2022-05-18 DIAGNOSIS — M25.561 CHRONIC PAIN OF BOTH KNEES: ICD-10-CM

## 2022-05-18 DIAGNOSIS — R63.4 WEIGHT LOSS: ICD-10-CM

## 2022-05-18 DIAGNOSIS — L89.311: ICD-10-CM

## 2022-05-18 DIAGNOSIS — G89.29 CHRONIC PAIN OF BOTH KNEES: ICD-10-CM

## 2022-05-18 PROCEDURE — 3079F DIAST BP 80-89 MM HG: CPT | Performed by: INTERNAL MEDICINE

## 2022-05-18 PROCEDURE — 3077F SYST BP >= 140 MM HG: CPT | Performed by: INTERNAL MEDICINE

## 2022-05-18 PROCEDURE — 99397 PER PM REEVAL EST PAT 65+ YR: CPT | Performed by: INTERNAL MEDICINE

## 2022-05-18 PROCEDURE — 3008F BODY MASS INDEX DOCD: CPT | Performed by: INTERNAL MEDICINE

## 2022-05-18 PROCEDURE — G0439 PPPS, SUBSEQ VISIT: HCPCS | Performed by: INTERNAL MEDICINE

## 2022-05-18 PROCEDURE — 96160 PT-FOCUSED HLTH RISK ASSMT: CPT | Performed by: INTERNAL MEDICINE

## 2022-05-18 RX ORDER — POTASSIUM CHLORIDE 750 MG/1
10 CAPSULE, EXTENDED RELEASE ORAL DAILY
Qty: 90 CAPSULE | Refills: 0 | Status: SHIPPED | OUTPATIENT
Start: 2022-05-18

## 2022-05-18 NOTE — TELEPHONE ENCOUNTER
Spoke to daughter earlier today, patient has appointment tomorrow will complete forms during office visit

## 2022-05-19 ENCOUNTER — PATIENT MESSAGE (OUTPATIENT)
Dept: INTERNAL MEDICINE CLINIC | Facility: CLINIC | Age: 87
End: 2022-05-19

## 2022-05-20 ENCOUNTER — TELEPHONE (OUTPATIENT)
Dept: INTERNAL MEDICINE CLINIC | Facility: CLINIC | Age: 87
End: 2022-05-20

## 2022-05-20 NOTE — TELEPHONE ENCOUNTER
Kelsie Kam RN 5/19/2022 5:54 PM CDT      ----- Message -----  From: Abdifatah Lind RN  Sent: 5/19/2022 5:12 PM CDT  To: Chery Mancuso RN  Subject: FW: DNR/POLST and POA for healthcare         ----- Message -----  From: Dickson Peters  Sent: 5/19/2022 3:41 PM CDT  To: Mayr Rn Triage  Subject: DNR/POLST and POA for healthcare     Per our convesation yesterday, I have attached the DNR/POLST and POA for healthcare. I will include the living will on another message as I can only attach 3 items to this message.

## 2022-05-20 NOTE — TELEPHONE ENCOUNTER
Spoke to daughter, reviewed patient's symptoms, we decided to observe today see what happens, if patient continues to have episodes of sundowning, we will try Seroquel 12.5 mg late afternoon, patient daughter will report if it is helpful. Patient will do blood test in the week. She states that patient was pretty restless last night but seems fine this today all day long.

## 2022-05-20 NOTE — TELEPHONE ENCOUNTER
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 Pt last had an office visit 5/18. Daughter states that the past two nights have been increasingly unmanageable. Pt begins at about 4pm getting agitated and she remains confused and agitated until about 1am.. Also daughter wants Dr. Jaky Johnson to know that they are no longer thinking about assisted living but they are now pursuing a memory care unit. \" I can't handle her anymore. \"

## 2022-05-23 ENCOUNTER — PATIENT MESSAGE (OUTPATIENT)
Dept: INTERNAL MEDICINE CLINIC | Facility: CLINIC | Age: 87
End: 2022-05-23

## 2022-05-23 DIAGNOSIS — R41.3 MEMORY LOSS: Primary | ICD-10-CM

## 2022-05-23 NOTE — TELEPHONE ENCOUNTER
From: Sole Scriver  To: Aura Alfaro MD  Sent: 5/23/2022 5:22 PM CDT  Subject: Neurologist    Can we please have a referral to a neurologist to discuss memory loss and memory medications.

## 2022-05-25 ENCOUNTER — OFFICE VISIT (OUTPATIENT)
Dept: PODIATRY CLINIC | Facility: CLINIC | Age: 87
End: 2022-05-25
Payer: COMMERCIAL

## 2022-05-25 DIAGNOSIS — M79.672 FOOT PAIN, BILATERAL: ICD-10-CM

## 2022-05-25 DIAGNOSIS — R26.81 GAIT INSTABILITY: Primary | ICD-10-CM

## 2022-05-25 DIAGNOSIS — B35.1 ONYCHOMYCOSIS: ICD-10-CM

## 2022-05-25 DIAGNOSIS — M20.42 HAMMERTOE, BILATERAL: ICD-10-CM

## 2022-05-25 DIAGNOSIS — L84 FOOT CALLUS: ICD-10-CM

## 2022-05-25 DIAGNOSIS — M79.671 FOOT PAIN, BILATERAL: ICD-10-CM

## 2022-05-25 DIAGNOSIS — M20.41 HAMMERTOE, BILATERAL: ICD-10-CM

## 2022-05-25 PROCEDURE — 99203 OFFICE O/P NEW LOW 30 MIN: CPT | Performed by: PODIATRIST

## 2022-05-25 PROCEDURE — 11721 DEBRIDE NAIL 6 OR MORE: CPT | Performed by: PODIATRIST

## 2022-05-25 PROCEDURE — 11055 PARING/CUTG B9 HYPRKER LES 1: CPT | Performed by: PODIATRIST

## 2022-05-27 ENCOUNTER — LAB ENCOUNTER (OUTPATIENT)
Dept: LAB | Age: 87
End: 2022-05-27
Attending: INTERNAL MEDICINE
Payer: MEDICARE

## 2022-05-27 ENCOUNTER — TELEPHONE (OUTPATIENT)
Dept: INTERNAL MEDICINE CLINIC | Facility: CLINIC | Age: 87
End: 2022-05-27

## 2022-05-27 DIAGNOSIS — I10 PRIMARY HYPERTENSION: ICD-10-CM

## 2022-05-27 DIAGNOSIS — R41.0 DELIRIUM: ICD-10-CM

## 2022-05-27 LAB
ALBUMIN SERPL-MCNC: 2.9 G/DL (ref 3.4–5)
ALBUMIN/GLOB SERPL: 0.9 {RATIO} (ref 1–2)
ALP LIVER SERPL-CCNC: 57 U/L
ALT SERPL-CCNC: 16 U/L
AMMONIA PLAS-MCNC: 12 UMOL/L (ref 11–32)
ANION GAP SERPL CALC-SCNC: 7 MMOL/L (ref 0–18)
AST SERPL-CCNC: 17 U/L (ref 15–37)
BASOPHILS # BLD AUTO: 0.04 X10(3) UL (ref 0–0.2)
BASOPHILS NFR BLD AUTO: 0.6 %
BILIRUB SERPL-MCNC: 0.5 MG/DL (ref 0.1–2)
BUN BLD-MCNC: 19 MG/DL (ref 7–18)
BUN/CREAT SERPL: 20.2 (ref 10–20)
CALCIUM BLD-MCNC: 9.4 MG/DL (ref 8.5–10.1)
CHLORIDE SERPL-SCNC: 101 MMOL/L (ref 98–112)
CO2 SERPL-SCNC: 30 MMOL/L (ref 21–32)
CREAT BLD-MCNC: 0.94 MG/DL
DEPRECATED RDW RBC AUTO: 49.8 FL (ref 35.1–46.3)
EOSINOPHIL # BLD AUTO: 0.22 X10(3) UL (ref 0–0.7)
EOSINOPHIL NFR BLD AUTO: 3.4 %
ERYTHROCYTE [DISTWIDTH] IN BLOOD BY AUTOMATED COUNT: 14.3 % (ref 11–15)
FASTING STATUS PATIENT QL REPORTED: NO
GLOBULIN PLAS-MCNC: 3.1 G/DL (ref 2.8–4.4)
GLUCOSE BLD-MCNC: 131 MG/DL (ref 70–99)
HCT VFR BLD AUTO: 37.2 %
HGB BLD-MCNC: 11.9 G/DL
IMM GRANULOCYTES # BLD AUTO: 0.02 X10(3) UL (ref 0–1)
IMM GRANULOCYTES NFR BLD: 0.3 %
LYMPHOCYTES # BLD AUTO: 2.31 X10(3) UL (ref 1–4)
LYMPHOCYTES NFR BLD AUTO: 35.4 %
MCH RBC QN AUTO: 29.9 PG (ref 26–34)
MCHC RBC AUTO-ENTMCNC: 32 G/DL (ref 31–37)
MCV RBC AUTO: 93.5 FL
MONOCYTES # BLD AUTO: 0.2 X10(3) UL (ref 0.1–1)
MONOCYTES NFR BLD AUTO: 3.1 %
NEUTROPHILS # BLD AUTO: 3.74 X10 (3) UL (ref 1.5–7.7)
NEUTROPHILS # BLD AUTO: 3.74 X10(3) UL (ref 1.5–7.7)
NEUTROPHILS NFR BLD AUTO: 57.2 %
OSMOLALITY SERPL CALC.SUM OF ELEC: 290 MOSM/KG (ref 275–295)
PLATELET # BLD AUTO: 257 10(3)UL (ref 150–450)
POTASSIUM SERPL-SCNC: 3.6 MMOL/L (ref 3.5–5.1)
PROT SERPL-MCNC: 6 G/DL (ref 6.4–8.2)
RBC # BLD AUTO: 3.98 X10(6)UL
SODIUM SERPL-SCNC: 138 MMOL/L (ref 136–145)
WBC # BLD AUTO: 6.5 X10(3) UL (ref 4–11)

## 2022-05-27 PROCEDURE — 36415 COLL VENOUS BLD VENIPUNCTURE: CPT

## 2022-05-27 PROCEDURE — 80053 COMPREHEN METABOLIC PANEL: CPT

## 2022-05-27 PROCEDURE — 82140 ASSAY OF AMMONIA: CPT

## 2022-05-27 PROCEDURE — 85025 COMPLETE CBC W/AUTO DIFF WBC: CPT

## 2022-05-27 NOTE — TELEPHONE ENCOUNTER
Pt's daughter came in today to drop off forms for mother transitioning into assisted living.  Forms placed in  box in [facility]

## 2022-05-29 ENCOUNTER — TELEPHONE (OUTPATIENT)
Dept: INTERNAL MEDICINE CLINIC | Facility: CLINIC | Age: 87
End: 2022-05-29

## 2022-05-29 ENCOUNTER — HOSPITAL ENCOUNTER (OUTPATIENT)
Facility: HOSPITAL | Age: 87
Setting detail: OBSERVATION
Discharge: NURSING, REHAB OR LONG-TERM CARE | End: 2022-05-31
Attending: EMERGENCY MEDICINE | Admitting: HOSPITALIST
Payer: MEDICARE

## 2022-05-29 DIAGNOSIS — R19.7 DIARRHEA, UNSPECIFIED TYPE: Primary | ICD-10-CM

## 2022-05-29 LAB
ANION GAP SERPL CALC-SCNC: 8 MMOL/L (ref 0–18)
BASOPHILS # BLD AUTO: 0.04 X10(3) UL (ref 0–0.2)
BASOPHILS NFR BLD AUTO: 0.5 %
BUN BLD-MCNC: 18 MG/DL (ref 7–18)
BUN/CREAT SERPL: 22.2 (ref 10–20)
C DIFF TOX B STL QL: NEGATIVE
CALCIUM BLD-MCNC: 9.2 MG/DL (ref 8.5–10.1)
CHLORIDE SERPL-SCNC: 99 MMOL/L (ref 98–112)
CO2 SERPL-SCNC: 28 MMOL/L (ref 21–32)
CREAT BLD-MCNC: 0.81 MG/DL
DEPRECATED RDW RBC AUTO: 47.4 FL (ref 35.1–46.3)
EOSINOPHIL # BLD AUTO: 0.24 X10(3) UL (ref 0–0.7)
EOSINOPHIL NFR BLD AUTO: 3.3 %
ERYTHROCYTE [DISTWIDTH] IN BLOOD BY AUTOMATED COUNT: 14.4 % (ref 11–15)
GLUCOSE BLD-MCNC: 103 MG/DL (ref 70–99)
HCT VFR BLD AUTO: 33.3 %
HGB BLD-MCNC: 11.3 G/DL
IMM GRANULOCYTES # BLD AUTO: 0.02 X10(3) UL (ref 0–1)
IMM GRANULOCYTES NFR BLD: 0.3 %
LYMPHOCYTES # BLD AUTO: 2.76 X10(3) UL (ref 1–4)
LYMPHOCYTES NFR BLD AUTO: 37.8 %
MCH RBC QN AUTO: 30.6 PG (ref 26–34)
MCHC RBC AUTO-ENTMCNC: 33.9 G/DL (ref 31–37)
MCV RBC AUTO: 90.2 FL
MONOCYTES # BLD AUTO: 0.4 X10(3) UL (ref 0.1–1)
MONOCYTES NFR BLD AUTO: 5.5 %
NEUTROPHILS # BLD AUTO: 3.84 X10 (3) UL (ref 1.5–7.7)
NEUTROPHILS # BLD AUTO: 3.84 X10(3) UL (ref 1.5–7.7)
NEUTROPHILS NFR BLD AUTO: 52.6 %
OSMOLALITY SERPL CALC.SUM OF ELEC: 282 MOSM/KG (ref 275–295)
PLATELET # BLD AUTO: 244 10(3)UL (ref 150–450)
POTASSIUM SERPL-SCNC: 3.5 MMOL/L (ref 3.5–5.1)
RBC # BLD AUTO: 3.69 X10(6)UL
SARS-COV-2 RNA RESP QL NAA+PROBE: NOT DETECTED
SODIUM SERPL-SCNC: 135 MMOL/L (ref 136–145)
WBC # BLD AUTO: 7.3 X10(3) UL (ref 4–11)

## 2022-05-29 PROCEDURE — 99220 INITIAL OBSERVATION CARE,LEVL III: CPT | Performed by: HOSPITALIST

## 2022-05-30 ENCOUNTER — PATIENT MESSAGE (OUTPATIENT)
Dept: INTERNAL MEDICINE CLINIC | Facility: CLINIC | Age: 87
End: 2022-05-30

## 2022-05-30 LAB
ADENOVIRUS F 40/41 PCR: NEGATIVE
ASTROVIRUS PCR: NEGATIVE
C CAYETANENSIS DNA SPEC QL NAA+PROBE: NEGATIVE
CAMPY SP DNA.DIARRHEA STL QL NAA+PROBE: NEGATIVE
CRYPTOSP DNA SPEC QL NAA+PROBE: NEGATIVE
EAEC PAA PLAS AGGR+AATA ST NAA+NON-PRB: NEGATIVE
EC STX1+STX2 + H7 FLIC SPEC NAA+PROBE: NEGATIVE
ENTAMOEBA HISTOLYTICA PCR: NEGATIVE
EPEC EAE GENE STL QL NAA+NON-PROBE: NEGATIVE
ETEC LTA+ST1A+ST1B TOX ST NAA+NON-PROBE: NEGATIVE
GIARDIA LAMBLIA PCR: NEGATIVE
NOROVIRUS GI/GII PCR: NEGATIVE
P SHIGELLOIDES DNA STL QL NAA+PROBE: NEGATIVE
ROTAVIRUS A PCR: NEGATIVE
SALMONELLA DNA SPEC QL NAA+PROBE: NEGATIVE
SAPOVIRUS PCR: NEGATIVE
SHIGELLA SP+EIEC IPAH ST NAA+NON-PROBE: NEGATIVE
V CHOLERAE DNA SPEC QL NAA+PROBE: NEGATIVE
VIBRIO DNA SPEC NAA+PROBE: NEGATIVE
YERSINIA DNA SPEC NAA+PROBE: NEGATIVE

## 2022-05-30 PROCEDURE — 99226 SUBSEQUENT OBSERVATION CARE: CPT | Performed by: HOSPITALIST

## 2022-05-30 RX ORDER — LOPERAMIDE HYDROCHLORIDE 2 MG/1
2 CAPSULE ORAL 3 TIMES DAILY PRN
Status: DISCONTINUED | OUTPATIENT
Start: 2022-05-30 | End: 2022-05-31

## 2022-05-30 RX ORDER — CARVEDILOL 6.25 MG/1
6.25 TABLET ORAL 2 TIMES DAILY WITH MEALS
Status: DISCONTINUED | OUTPATIENT
Start: 2022-05-30 | End: 2022-05-31

## 2022-05-30 RX ORDER — LOPERAMIDE HYDROCHLORIDE 2 MG/1
4 CAPSULE ORAL ONCE
Status: COMPLETED | OUTPATIENT
Start: 2022-05-30 | End: 2022-05-30

## 2022-05-30 RX ORDER — ONDANSETRON 2 MG/ML
4 INJECTION INTRAMUSCULAR; INTRAVENOUS EVERY 6 HOURS PRN
Status: DISCONTINUED | OUTPATIENT
Start: 2022-05-30 | End: 2022-05-31

## 2022-05-30 RX ORDER — QUETIAPINE FUMARATE 25 MG/1
12.5 TABLET, FILM COATED ORAL AS NEEDED
Status: DISCONTINUED | OUTPATIENT
Start: 2022-05-30 | End: 2022-05-31

## 2022-05-30 RX ORDER — ACETAMINOPHEN 325 MG/1
650 TABLET ORAL EVERY 6 HOURS PRN
Status: DISCONTINUED | OUTPATIENT
Start: 2022-05-30 | End: 2022-05-31

## 2022-05-30 RX ORDER — CARVEDILOL 12.5 MG/1
12.5 TABLET ORAL 2 TIMES DAILY WITH MEALS
Status: DISCONTINUED | OUTPATIENT
Start: 2022-05-30 | End: 2022-05-30

## 2022-05-30 RX ORDER — ENOXAPARIN SODIUM 100 MG/ML
40 INJECTION SUBCUTANEOUS DAILY
Status: DISCONTINUED | OUTPATIENT
Start: 2022-05-30 | End: 2022-05-31

## 2022-05-30 RX ORDER — ONDANSETRON 2 MG/ML
4 INJECTION INTRAMUSCULAR; INTRAVENOUS EVERY 4 HOURS PRN
Status: ACTIVE | OUTPATIENT
Start: 2022-05-30 | End: 2022-05-30

## 2022-05-30 RX ORDER — HYDRALAZINE HYDROCHLORIDE 25 MG/1
25 TABLET, FILM COATED ORAL EVERY 6 HOURS PRN
Status: DISCONTINUED | OUTPATIENT
Start: 2022-05-30 | End: 2022-05-31

## 2022-05-30 RX ORDER — MELATONIN
3 NIGHTLY PRN
Status: DISCONTINUED | OUTPATIENT
Start: 2022-05-30 | End: 2022-05-31

## 2022-05-30 NOTE — CM/SW NOTE
05/30/22 0800   CM/SW Referral Data   Referral Source Physician   Reason for Referral   [facility] Orders)   Informant Daughter  Hermilo Khan)   Pertinent Medical Hx   Does patient have an established PCP? Yes  Kellee Mantilla)   Patient [address]   Number of Levels in Home 2   Number of Stair in Home 7   Patient lives with Daughter   Patient Status Prior to Admission   Independent with ADLs and Mobility No   Pt. requires assistance with Housework;Driving; Ambulating;Medications   Services in place prior to admission Home Health Care;DME/Supplies at home   [address] Provider [address]   Type of DME/Supplies Standard [place]   Discharge Needs   Anticipated D/C needs Home health care     Received MDO for [facility] Orders. Per chart review, pt has current hx w/ [facility]. ANSHUL contacted pt's dtr Alberto Prescott via phone. Above assessment completed. Per Alberto Prescott, pt is currently living w/ her and her family located at:     [address], [address]    Alberto Prescott informed ANSHUL that pt is current w/ PT/RN via [facility] as arranged by PCP. Per Alberto Prescott, RN assists w/ wound care at home. Alberto Prescott is also an RN. Pt is never left alone due to memory/Dementia concerns. ANSHUL sent updates to [facility] via etaskr. CECI orders entered and sent. Alberto Prescott informed SW that they have been working on getting pt accepted and moved into [facility]. Alberto Prescott is asking for SW/CM assistance if possible. This SW informed Alberto Prescott that SW/CM will need to f/up on that Tuesday 5/31 due to today being a holiday. She expressed understanding. Alberto Prescott also understands that SW/CM likely unable to get pt directly into [facility]. PLAN: Home w/ dtr & [facility] - pending clinical course      SW/CM to remain available for support and/or discharge planning.        Jim Stoddard, MSW, [address] [facility]

## 2022-05-30 NOTE — PLAN OF CARE
Patient resting in chair. Patient is impulsive and does not wait for staff. No acute changes. Safety measures maintained. Will continue to monitor. Problem: Patient Centered Care  Goal: Patient preferences are identified and integrated in the patient's plan of care  Description: Interventions:  - What would you like us to know as we care for you?  I live with my daughter  - Provide timely, complete, and accurate information to patient/family  - Incorporate patient and family knowledge, values, beliefs, and cultural backgrounds into the planning and delivery of care  - Encourage patient/family to participate in care and decision-making at the level they choose  - Honor patient and family perspectives and choices  Outcome: Progressing     Problem: Patient/Family Goals  Goal: Patient/Family Long Term Goal  Description: Patient's Long Term Goal:     Interventions:  -   - See additional Care Plan goals for specific interventions  Outcome: Progressing  Goal: Patient/Family Short Term Goal  Description: Patient's Short Term Goal:     Interventions:   -   - See additional Care Plan goals for specific interventions  Outcome: Progressing

## 2022-05-30 NOTE — ED QUICK NOTES
Orders for admission, patient is aware of plan and ready to go upstairs. Any questions, please call ED RN Julia Simms at extension [phone].      Patient Covid vaccination status: Fully vaccinated     COVID Test Ordered in ED: Rapid SARS-CoV-2 by PCR    COVID Suspicion at Admission: Low clinical suspicion for COVID    Running Infusions:   0.9 bolus    Mental Status/LOC at time of transport: a&o 4/4    Other pertinent information:   CIWA score: N/A   NIH score:  N/A

## 2022-05-30 NOTE — SPIRITUAL CARE NOTE
Pt was sitting up in bed with family at bedside. Pt is being treated for diarrhea.  introduced herself and the SCT, while offering radical hospitality, which the the pt requested Congregation communion which the  ordered.  left the family as more family entered the room.  left family with a blessing.  will revisit pt.

## 2022-05-30 NOTE — TELEPHONE ENCOUNTER
From: Zackary Camacho  To: Claude Glen, MD  Sent: 5/30/2022 2:19 PM CDT  Subject: Paper work for [employer]    Please complete the paperwork that I handed in to the office last Friday as were trying to get my mom directly admitted to [facility] from the hospital. Please let me know if you need further information. Fax number [phone], attn: Alis Russell    Thank you,  Elbert Aguillon

## 2022-05-31 ENCOUNTER — TELEPHONE (OUTPATIENT)
Dept: INTERNAL MEDICINE CLINIC | Facility: CLINIC | Age: 87
End: 2022-05-31

## 2022-05-31 VITALS
OXYGEN SATURATION: 99 % | WEIGHT: 131.31 LBS | BODY MASS INDEX: 23.27 KG/M2 | RESPIRATION RATE: 18 BRPM | DIASTOLIC BLOOD PRESSURE: 57 MMHG | SYSTOLIC BLOOD PRESSURE: 112 MMHG | HEIGHT: 63 IN | HEART RATE: 81 BPM | TEMPERATURE: 98 F

## 2022-05-31 LAB
ALBUMIN SERPL-MCNC: 2.6 G/DL (ref 3.4–5)
ALBUMIN/GLOB SERPL: 1 {RATIO} (ref 1–2)
ALP LIVER SERPL-CCNC: 54 U/L
ALT SERPL-CCNC: 13 U/L
ANION GAP SERPL CALC-SCNC: 7 MMOL/L (ref 0–18)
AST SERPL-CCNC: 13 U/L (ref 15–37)
BASOPHILS # BLD AUTO: 0.04 X10(3) UL (ref 0–0.2)
BASOPHILS NFR BLD AUTO: 0.8 %
BILIRUB SERPL-MCNC: 0.5 MG/DL (ref 0.1–2)
BUN BLD-MCNC: 13 MG/DL (ref 7–18)
BUN/CREAT SERPL: 18.8 (ref 10–20)
CALCIUM BLD-MCNC: 9.4 MG/DL (ref 8.5–10.1)
CHLORIDE SERPL-SCNC: 103 MMOL/L (ref 98–112)
CO2 SERPL-SCNC: 29 MMOL/L (ref 21–32)
CREAT BLD-MCNC: 0.69 MG/DL
DEPRECATED RDW RBC AUTO: 47.7 FL (ref 35.1–46.3)
EOSINOPHIL # BLD AUTO: 0.23 X10(3) UL (ref 0–0.7)
EOSINOPHIL NFR BLD AUTO: 4.5 %
ERYTHROCYTE [DISTWIDTH] IN BLOOD BY AUTOMATED COUNT: 14.3 % (ref 11–15)
GLOBULIN PLAS-MCNC: 2.5 G/DL (ref 2.8–4.4)
GLUCOSE BLD-MCNC: 93 MG/DL (ref 70–99)
HCT VFR BLD AUTO: 31.2 %
HGB BLD-MCNC: 10.4 G/DL
IMM GRANULOCYTES # BLD AUTO: 0.01 X10(3) UL (ref 0–1)
IMM GRANULOCYTES NFR BLD: 0.2 %
LYMPHOCYTES # BLD AUTO: 1.96 X10(3) UL (ref 1–4)
LYMPHOCYTES NFR BLD AUTO: 38.1 %
MAGNESIUM SERPL-MCNC: 1.9 MG/DL (ref 1.6–2.6)
MCH RBC QN AUTO: 30.2 PG (ref 26–34)
MCHC RBC AUTO-ENTMCNC: 33.3 G/DL (ref 31–37)
MCV RBC AUTO: 90.7 FL
MONOCYTES # BLD AUTO: 0.32 X10(3) UL (ref 0.1–1)
MONOCYTES NFR BLD AUTO: 6.2 %
NEUTROPHILS # BLD AUTO: 2.59 X10 (3) UL (ref 1.5–7.7)
NEUTROPHILS # BLD AUTO: 2.59 X10(3) UL (ref 1.5–7.7)
NEUTROPHILS NFR BLD AUTO: 50.2 %
OSMOLALITY SERPL CALC.SUM OF ELEC: 288 MOSM/KG (ref 275–295)
PHOSPHATE SERPL-MCNC: 3.3 MG/DL (ref 2.5–4.9)
PLATELET # BLD AUTO: 218 10(3)UL (ref 150–450)
POTASSIUM SERPL-SCNC: 3.3 MMOL/L (ref 3.5–5.1)
PROT SERPL-MCNC: 5.1 G/DL (ref 6.4–8.2)
RBC # BLD AUTO: 3.44 X10(6)UL
SODIUM SERPL-SCNC: 139 MMOL/L (ref 136–145)
WBC # BLD AUTO: 5.2 X10(3) UL (ref 4–11)

## 2022-05-31 PROCEDURE — 99217 OBSERVATION CARE DISCHARGE: CPT | Performed by: HOSPITALIST

## 2022-05-31 RX ORDER — MELATONIN
Qty: 30 TABLET | Refills: 0 | Status: SHIPPED | OUTPATIENT
Start: 2022-05-31

## 2022-05-31 RX ORDER — POTASSIUM CHLORIDE 20 MEQ/1
40 TABLET, EXTENDED RELEASE ORAL ONCE
Status: COMPLETED | OUTPATIENT
Start: 2022-05-31 | End: 2022-05-31

## 2022-05-31 RX ORDER — CARVEDILOL 6.25 MG/1
9.38 TABLET ORAL 2 TIMES DAILY WITH MEALS
Qty: 60 TABLET | Refills: 0 | Status: SHIPPED | OUTPATIENT
Start: 2022-05-31

## 2022-05-31 RX ORDER — QUETIAPINE FUMARATE 25 MG/1
12.5 TABLET, FILM COATED ORAL AS NEEDED
Qty: 30 TABLET | Refills: 0 | Status: SHIPPED | OUTPATIENT
Start: 2022-05-31

## 2022-05-31 RX ORDER — LOPERAMIDE HYDROCHLORIDE 2 MG/1
2 CAPSULE ORAL 3 TIMES DAILY PRN
Qty: 15 CAPSULE | Refills: 0 | Status: SHIPPED | OUTPATIENT
Start: 2022-05-31

## 2022-05-31 RX ORDER — ACETAMINOPHEN 325 MG/1
650 TABLET ORAL EVERY 6 HOURS PRN
Qty: 1 TABLET | Refills: 0 | Status: SHIPPED | COMMUNITY
Start: 2022-05-31

## 2022-05-31 NOTE — PLAN OF CARE
Problem: Patient Centered Care  Goal: Patient preferences are identified and integrated in the patient's plan of care  Description: Interventions:  - What would you like us to know as we care for you?   - Provide timely, complete, and accurate information to patient/family  - Incorporate patient and family knowledge, values, beliefs, and cultural backgrounds into the planning and delivery of care  - Encourage patient/family to participate in care and decision-making at the level they choose  - Honor patient and family perspectives and choices  Outcome: Progressing     Problem: Patient/Family Goals  Goal: Patient/Family Long Term Goal  Description: Patient's Long Term Goal: to go home    Interventions:  - Follow POC  - See additional Care Plan goals for specific interventions  Outcome: Progressing  Goal: Patient/Family Short Term Goal  Description: Patient's Short Term Goal: To get well    Interventions:   - Follow POC  - See additional Care Plan goals for specific interventions  Outcome: Progressing     Problem: PAIN - ADULT  Goal: Verbalizes/displays adequate comfort level or patient's stated pain goal  Description: INTERVENTIONS:  - Encourage pt to monitor pain and request assistance  - Assess pain using appropriate pain scale  - Administer analgesics based on type and severity of pain and evaluate response  - Implement non-pharmacological measures as appropriate and evaluate response  - Consider cultural and social influences on pain and pain management  - Manage/alleviate anxiety  - Utilize distraction and/or relaxation techniques  - Monitor for opioid side effects  - Notify MD/LIP if interventions unsuccessful or patient reports new pain  - Anticipate increased pain with activity and pre-medicate as appropriate  Outcome: Progressing     Problem: SKIN/TISSUE INTEGRITY - ADULT  Goal: Skin integrity remains intact  Description: INTERVENTIONS  - Assess and document risk factors for pressure ulcer development  - Assess and document skin integrity  - Monitor for areas of redness and/or skin breakdown  - Initiate interventions, skin care algorithm/standards of care as needed  Outcome: Progressing  Goal: Incision(s), wounds(s) or drain site(s) healing without S/S of infection  Description: INTERVENTIONS:  - Assess and document risk factors for pressure ulcer development  - Assess and document skin integrity  - Assess and document dressing/incision, wound bed, drain sites and surrounding tissue  - Implement wound care per orders  - Initiate isolation precautions as appropriate  - Initiate Pressure Ulcer prevention bundle as indicated  Outcome: Progressing  Goal: Oral mucous membranes remain intact  Description: INTERVENTIONS  - Assess oral mucosa and hygiene practices  - Implement preventative oral hygiene regimen  - Implement oral medicated treatments as ordered  Outcome: Progressing

## 2022-05-31 NOTE — CM/SW NOTE
ANSHUL received call from pt's dtr Monae Diallo. ANSHUL spoke with Monae Diallo regarding DC plan to [facility]. Monae Diallo reports prior to hospitalization, family was planning on moving pt in to memory care. ANSHUL called April/RN at [facility] to discuss case. April directed SW to send clinical packet to facility F: [phone]. Complete packet sent, received confirmation fax of success. April to discuss with RADHAMES Barnes. ANSHUL received call from Devante with [facility] p: [phone]. Per Devante, all paperwork has been obtained besides \"physician move in orders\" signed by MD. Daughter reports she provided paperwork to PCP last week. @1PM  Daughter received confirmation that required paperwork from PCP has been completed and will be faxed directly to [facility]. Daughter noted that the med rec section will be left blank, deferring to hospital DC summary. ANSHUL notified Dr. Jevon Xiong of this. @1:30PM  ANSHUL received call from Molly, Director of Nursing at [facility] who confirmed that pt will be able to admit today 5/31. Molly/RADHAMES confirmed that all necessary clinicals and paperwork has been obtained. Molly confirmed pt can continue Mook Crawley at [facility]. ANSHUL notified Yesika Ceron of DC for today 5/31. ANSHUL discussed DC plan with MD karlos and RN    PLAN: DC to [facility] today 5/31 with Mook Crawley family for transport  Report # [phone]    SW remains available for support and/or discharge planning. Please do not hesitate to call/chat SW if further DC needs arise.      Claudean Morning MSW, [facility]  Ext [phone]

## 2022-05-31 NOTE — TELEPHONE ENCOUNTER
Miranda Lyle would like to know the status of the forms. Needs them ASAP. Please call her at [phone]  Thank you.

## 2022-05-31 NOTE — PLAN OF CARE
Problem: Patient Centered Care  Goal: Patient preferences are identified and integrated in the patient's plan of care  Description: Interventions:  - What would you like us to know as we care for you?   - Provide timely, complete, and accurate information to patient/family  - Incorporate patient and family knowledge, values, beliefs, and cultural backgrounds into the planning and delivery of care  - Encourage patient/family to participate in care and decision-making at the level they choose  - Honor patient and family perspectives and choices  Outcome: Completed     Problem: PAIN - ADULT  Goal: Verbalizes/displays adequate comfort level or patient's stated pain goal  Description: INTERVENTIONS:  - Encourage pt to monitor pain and request assistance  - Assess pain using appropriate pain scale  - Administer analgesics based on type and severity of pain and evaluate response  - Implement non-pharmacological measures as appropriate and evaluate response  - Consider cultural and social influences on pain and pain management  - Manage/alleviate anxiety  - Utilize distraction and/or relaxation techniques  - Monitor for opioid side effects  - Notify MD/LIP if interventions unsuccessful or patient reports new pain  - Anticipate increased pain with activity and pre-medicate as appropriate  Outcome: Completed     Problem: SKIN/TISSUE INTEGRITY - ADULT  Goal: Skin integrity remains intact  Description: INTERVENTIONS  - Assess and document risk factors for pressure ulcer development  - Assess and document skin integrity  - Monitor for areas of redness and/or skin breakdown  - Initiate interventions, skin care algorithm/standards of care as needed  Outcome: Completed  Goal: Incision(s), wounds(s) or drain site(s) healing without S/S of infection  Description: INTERVENTIONS:  - Assess and document risk factors for pressure ulcer development  - Assess and document skin integrity  - Assess and document dressing/incision, wound bed, drain sites and surrounding tissue  - Implement wound care per orders  - Initiate isolation precautions as appropriate  - Initiate Pressure Ulcer prevention bundle as indicated  Outcome: Completed  Goal: Oral mucous membranes remain intact  Description: INTERVENTIONS  - Assess oral mucosa and hygiene practices  - Implement preventative oral hygiene regimen  - Implement oral medicated treatments as ordered  Outcome: Completed   Patient a/ox3; denies pain; VSS; order to discharge patient acknowledged; patient will go to [facility], to be transported by [facility]. Report given to CRISTIAN Barnes. Discussed AVS and RX; PIV removed.

## 2022-05-31 NOTE — TELEPHONE ENCOUNTER
Sherif Hui returning call, states they need paperwork signed and faxed to her within the next 30 minutes.

## 2022-05-31 NOTE — TELEPHONE ENCOUNTER
Faxed paperwork that was completed by Dr. Chris Goodrich and confirmation received. Sent to scanning.

## 2022-05-31 NOTE — TELEPHONE ENCOUNTER
Patient daughter Dillon Mendez called stating the PCP needs to complete a form for patient to be transferred to [facility] facility. Per Dillon Mendez she dropped the paperwork off at [facility] on Friday. Once complete please fax form to [phone] attention Juan Pablo Arroyo.

## 2022-05-31 NOTE — TELEPHONE ENCOUNTER
(Message Delivered)   D E L I V E R I E S :  2022 04:16p           [facility]         Delivered  2022 04:16p           YONATHAN         Connected Caller  ------------ F O L L O W - U P------------- :  2022 04:16p YONATHAN  CALLED DR AT HOME, CONNECTED  ======================================================================  Text Messages    Message # [phone]         2022 06:11p   [[facility]]  To:  Jessica Oneill  From:  Carmencita Bright MD:  Phone#:  [phone]  ----------------------------------------------------------------------  RE MOTHER KUSHAL Garcia,  [birth year], DIARR FOR 48 HRS

## 2022-05-31 NOTE — DISCHARGE SUMMARY
Dc summary#[id number]  > 30 min spent on [facility] Discharge Diagnoses: diarrhea and dehydration    Lace+ Score: 72  59-90 High Risk  29-58 Medium Risk  0-28   Low Risk. TCM Follow-Up Recommendation:  LACE 29-58:  Moderate Risk of readmission after discharge from the hospital.tcm fu recommended

## 2022-06-01 NOTE — DISCHARGE SUMMARY
[facility]    PATIENT'S NAME: Feliz Neves   ATTENDING PHYSICIAN: Esdras Miranda MD   PATIENT ACCOUNT#:   [id number]    LOCATION:  [address] [place] [address] RECORD #:   [id number]       YOB: 1934  ADMISSION DATE:       05/29/2022      DISCHARGE DATE:  05/31/2022    DISCHARGE SUMMARY      About 45 minutes were spent coordinating care with social work and Dr. Sara Santoyo on the phone, counseling patient and her daughter in the room, and preparing the patient's discharge to [facility]. DISCHARGE DIAGNOSIS:  Diarrhea and dehydration. HISTORY AND HOSPITAL COURSE:  This is a very pleasant 66-year-old white female with vascular dementia who was about to go into a memory care unit, who was on antibiotics for a urinary tract infection. She got watery diarrhea and was sent by her primary doctor to the emergency room. The patient was given IV fluids. Her diarrhea workup was completely negative, stool panel and C difficile, and she was able to be discharged. She will go straight to memory care instead of home to make it easier for the family. Dr. Sara Santoyo filled out the paperwork. PHYSICAL EXAMINATION:    VITAL SIGNS:  On discharge, temperature is 97.6, pulse 81, respiratory rate 18, blood pressure 112/57, 99%. LUNGS:  Clear. HEART:  Normal S1 and S2. No S3.   ABDOMEN:  Soft. EXTREMITIES:  No calf tenderness. NEUROLOGIC:  She is alert and oriented, polite, friendly, and cooperative. LABORATORY STUDIES:  Please see chart. ASSESSMENT AND PLAN:    1. Diarrhea, now better. We will continue Imodium. No more antibiotics. 2.   Vascular dementia. Patient is going to memory care. Hopefully will thrive there. 3.   Hypertension. Continue medications. They were adjusted. No hydrochlorothiazide. Try to avoid. 4.   Dehydration, now replaced. CONDITION ON DISCHARGE:  Stable. CODE STATUS:  Full Code.   Discussed with patient's daughter during this hospitalization. ACTIVITY:  No heavy exercise. Otherwise, as tolerated. FOLLOWUP:  With Dr. Warren Heller in the office for CT abdomen, workup of weight loss, etc.  I explained to the patient's daughter that since the weight loss has been there for months we would not be able to do routine scanning outpatient studies for reasons not related to the reason for hospitalization. She understood and agreed, and will follow up with Dr. Warren Heller, although she asked that the CTs originally be done here. Return if fevers, chills, more diarrhea, shortness of breath, etc.    DISCHARGE MEDICATIONS:    1. Quetiapine 12.5 mg as needed for agitation. Watch drowsiness. Watch severe muscle stiffness. No alcohol. 2.   Tylenol 650 mg q.6 h. as needed for fever. Watch total daily Tylenol, limit to 3 g.   3.   Coreg 9.375 mg twice a day with meals. 4.   Loperamide 2 mg 3 times a day as needed for diarrhea. 5.   Melatonin 3 mg daily. RISK OF READMISSION:  Moderate. TCM followup is recommended. Dictated By Wenceslao Pierce.  MD Ruben  d: 05/31/2022 18:01:09  t: 05/31/2022 22:07:56  Job [id number]  JARRELL/

## 2022-06-07 ENCOUNTER — MED REC SCAN ONLY (OUTPATIENT)
Dept: INTERNAL MEDICINE CLINIC | Facility: CLINIC | Age: 87
End: 2022-06-07

## 2022-06-16 ENCOUNTER — TELEPHONE (OUTPATIENT)
Dept: INTERNAL MEDICINE CLINIC | Facility: CLINIC | Age: 87
End: 2022-06-16

## 2022-06-16 ENCOUNTER — OFFICE VISIT (OUTPATIENT)
Dept: ORTHOPEDICS CLINIC | Facility: CLINIC | Age: 87
End: 2022-06-16
Payer: COMMERCIAL

## 2022-06-16 VITALS — HEART RATE: 74 BPM | DIASTOLIC BLOOD PRESSURE: 62 MMHG | SYSTOLIC BLOOD PRESSURE: 93 MMHG

## 2022-06-16 DIAGNOSIS — M17.0 PRIMARY OSTEOARTHRITIS OF BOTH KNEES: Primary | ICD-10-CM

## 2022-06-16 RX ORDER — TRIAMCINOLONE ACETONIDE 40 MG/ML
40 INJECTION, SUSPENSION INTRA-ARTICULAR; INTRAMUSCULAR
Status: SHIPPED | OUTPATIENT
Start: 2022-06-16 | End: 2022-06-16

## 2022-06-16 RX ORDER — FUROSEMIDE 20 MG/1
1 TABLET ORAL DAILY
COMMUNITY
Start: 2022-06-16

## 2022-06-16 RX ORDER — HYDROCHLOROTHIAZIDE 25 MG/1
1 TABLET ORAL DAILY
COMMUNITY
Start: 2022-06-07

## 2022-06-16 NOTE — PROGRESS NOTES
Per verbal order from Dr. Hansa Austin, draw up 3ml of 0.5% Marcaine & 2ml 1% lidocaine and 1ml of Kenalog 40 for cortisone injection to bilateral knees  Patient provided education handout for cortisone injection.

## 2022-06-16 NOTE — TELEPHONE ENCOUNTER
[facility] order received on 6/16/2022, order number [id number], orders on Dr. Phil brenner for signature

## 2022-07-07 ENCOUNTER — TELEPHONE (OUTPATIENT)
Dept: CASE MANAGEMENT | Age: 87
End: 2022-07-07

## 2022-07-07 NOTE — TELEPHONE ENCOUNTER
Referral placed for patient to see Dr. Sujey Cook orthopedic specialist, initially request was placed on 5/15/2022, patient saw Dr. Gisell Mcintosh on 6-16- 2022t 1 month later,

## 2022-07-07 NOTE — TELEPHONE ENCOUNTER
Good Afternoon Dr aMrtin Better and staff,    Ref# [id number]    Please provide name of Ortho specialist you are referring patient to so I can obtain prior auth on [facility] website for patients insurance plan.     Thank you    [facility]

## 2022-07-12 ENCOUNTER — LAB ENCOUNTER (OUTPATIENT)
Dept: LAB | Facility: HOSPITAL | Age: 87
End: 2022-07-12
Attending: Other
Payer: MEDICARE

## 2022-07-12 DIAGNOSIS — F01.50 MIXED ALZHEIMER'S AND VASCULAR DEMENTIA (HCC): ICD-10-CM

## 2022-07-12 DIAGNOSIS — G30.9 MIXED ALZHEIMER'S AND VASCULAR DEMENTIA (HCC): ICD-10-CM

## 2022-07-12 DIAGNOSIS — F02.80 MIXED ALZHEIMER'S AND VASCULAR DEMENTIA (HCC): ICD-10-CM

## 2022-07-12 LAB
FOLATE SERPL-MCNC: 13.3 NG/ML (ref 8.7–?)
VIT B12 SERPL-MCNC: >2000 PG/ML (ref 193–986)

## 2022-07-12 PROCEDURE — 82607 VITAMIN B-12: CPT

## 2022-07-12 PROCEDURE — 84425 ASSAY OF VITAMIN B-1: CPT

## 2022-07-12 PROCEDURE — 83921 ORGANIC ACID SINGLE QUANT: CPT

## 2022-07-12 PROCEDURE — 84207 ASSAY OF VITAMIN B-6: CPT

## 2022-07-12 PROCEDURE — 82746 ASSAY OF FOLIC ACID SERUM: CPT

## 2022-07-12 PROCEDURE — 36415 COLL VENOUS BLD VENIPUNCTURE: CPT

## 2022-07-12 PROCEDURE — 82525 ASSAY OF COPPER: CPT

## 2022-07-13 ENCOUNTER — TELEPHONE (OUTPATIENT)
Dept: NEUROLOGY | Facility: CLINIC | Age: 87
End: 2022-07-13

## 2022-07-13 NOTE — TELEPHONE ENCOUNTER
Reviewed results.   Noted hyponatremia, hypokalemia, very mild anemia, B12 deficiency (258)  Will send for scanning    TEJAL Dillard DO  7/13/2022 9:45 AM

## 2022-07-13 NOTE — TELEPHONE ENCOUNTER
Received lab reports from [facility], done on 6/30/22. Placed on Dr. Franck Barr desantoni for review.

## 2022-07-14 LAB — COPPER, SERUM: 125.9 UG/DL

## 2022-07-15 LAB
MMA: 0.14 UMOL/L
VITAMIN B6: 26 NMOL/L

## 2022-07-16 LAB — VITAMIN B1 (THIAMINE), WHOLE B: 59 NMOL/L

## 2022-07-19 DIAGNOSIS — E51.9 THIAMINE DEFICIENCY: Primary | ICD-10-CM

## 2022-07-19 DIAGNOSIS — G30.9 MIXED ALZHEIMER'S AND VASCULAR DEMENTIA (HCC): ICD-10-CM

## 2022-07-19 DIAGNOSIS — F02.80 MIXED ALZHEIMER'S AND VASCULAR DEMENTIA (HCC): ICD-10-CM

## 2022-07-19 DIAGNOSIS — F01.50 MIXED ALZHEIMER'S AND VASCULAR DEMENTIA (HCC): ICD-10-CM

## 2022-07-19 RX ORDER — MELATONIN
Qty: 120 TABLET | Refills: 0 | Status: SHIPPED | OUTPATIENT
Start: 2022-07-19 | End: 2022-07-20

## 2022-08-15 RX ORDER — MELATONIN
Qty: 90 TABLET | Refills: 10 | OUTPATIENT
Start: 2022-08-15

## 2022-09-02 ENCOUNTER — HOSPITAL ENCOUNTER (OUTPATIENT)
Dept: MRI IMAGING | Facility: HOSPITAL | Age: 87
Discharge: HOME | End: 2022-09-02
Attending: Other
Payer: MEDICARE

## 2022-09-02 DIAGNOSIS — F02.80 MIXED ALZHEIMER'S AND VASCULAR DEMENTIA (HCC): ICD-10-CM

## 2022-09-02 DIAGNOSIS — G30.9 MIXED ALZHEIMER'S AND VASCULAR DEMENTIA (HCC): ICD-10-CM

## 2022-09-02 DIAGNOSIS — F01.50 MIXED ALZHEIMER'S AND VASCULAR DEMENTIA (HCC): ICD-10-CM

## 2022-09-02 PROCEDURE — 70551 MRI BRAIN STEM W/O DYE: CPT | Performed by: OTHER

## 2022-09-06 ENCOUNTER — TELEPHONE (OUTPATIENT)
Dept: NEUROLOGY | Facility: CLINIC | Age: 87
End: 2022-09-06

## 2022-09-06 NOTE — TELEPHONE ENCOUNTER
Patient daughter returned call. Relayed message from Dr Beny Sabillon as noted below.  Daughter Luli Records verbalized understanding & appreciative of call

## 2022-09-06 NOTE — TELEPHONE ENCOUNTER
Please let patient know her MRI brain shows mild brain shrinkage in her temporal lobes, where her memory centers are. There is also mild white matter changes most likely related to high blood pressure. 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 I will go over these MRI findings with the patient and family at our follow up visit next month and discuss them more in detail. We do not see any unexpected findings such as strokes or bleeding in the brain. Thanks!

## 2022-09-13 ENCOUNTER — OFFICE VISIT (OUTPATIENT)
Dept: WOUND CARE | Facility: HOSPITAL | Age: 87
End: 2022-09-13
Attending: NURSE PRACTITIONER
Payer: MEDICARE

## 2022-09-13 VITALS
HEART RATE: 88 BPM | RESPIRATION RATE: 18 BRPM | HEIGHT: 65 IN | OXYGEN SATURATION: 96 % | BODY MASS INDEX: 21.66 KG/M2 | DIASTOLIC BLOOD PRESSURE: 65 MMHG | SYSTOLIC BLOOD PRESSURE: 138 MMHG | TEMPERATURE: 98 F | WEIGHT: 130 LBS

## 2022-09-13 DIAGNOSIS — L98.429 SKIN ULCER OF SACRUM, UNSPECIFIED ULCER STAGE (HCC): ICD-10-CM

## 2022-09-13 PROCEDURE — 99215 OFFICE O/P EST HI 40 MIN: CPT | Performed by: NURSE PRACTITIONER

## 2022-10-26 ENCOUNTER — OFFICE VISIT (OUTPATIENT)
Dept: ORTHOPEDICS CLINIC | Facility: CLINIC | Age: 87
End: 2022-10-26
Payer: COMMERCIAL

## 2022-10-26 VITALS — HEART RATE: 74 BPM | DIASTOLIC BLOOD PRESSURE: 80 MMHG | SYSTOLIC BLOOD PRESSURE: 134 MMHG

## 2022-10-26 DIAGNOSIS — M17.0 PRIMARY OSTEOARTHRITIS OF BOTH KNEES: Primary | ICD-10-CM

## 2022-10-26 PROCEDURE — 1125F AMNT PAIN NOTED PAIN PRSNT: CPT | Performed by: ORTHOPAEDIC SURGERY

## 2022-10-26 PROCEDURE — 3075F SYST BP GE 130 - 139MM HG: CPT | Performed by: ORTHOPAEDIC SURGERY

## 2022-10-26 PROCEDURE — 99213 OFFICE O/P EST LOW 20 MIN: CPT | Performed by: ORTHOPAEDIC SURGERY

## 2022-10-26 PROCEDURE — 3079F DIAST BP 80-89 MM HG: CPT | Performed by: ORTHOPAEDIC SURGERY

## 2022-10-26 PROCEDURE — 20610 DRAIN/INJ JOINT/BURSA W/O US: CPT | Performed by: ORTHOPAEDIC SURGERY

## 2022-10-26 RX ORDER — TRIAMCINOLONE ACETONIDE 40 MG/ML
40 INJECTION, SUSPENSION INTRA-ARTICULAR; INTRAMUSCULAR
Status: COMPLETED | OUTPATIENT
Start: 2022-10-26 | End: 2022-10-26

## 2022-10-26 RX ADMIN — TRIAMCINOLONE ACETONIDE 40 MG: 40 INJECTION, SUSPENSION INTRA-ARTICULAR; INTRAMUSCULAR at 15:46:00

## 2022-10-26 RX ADMIN — TRIAMCINOLONE ACETONIDE 40 MG: 40 INJECTION, SUSPENSION INTRA-ARTICULAR; INTRAMUSCULAR at 15:47:00

## 2022-10-26 NOTE — PROGRESS NOTES
Per verbal order from Dr. Paz Garcia draw up 3ml of 0.5% Marcaine & 2ml 1% lidocaine and 1ml of Kenalog 40 for cortisone injection to Bilateral Knee [facility]    Patient provided education handout for cortisone injection.

## 2023-03-02 ENCOUNTER — OFFICE VISIT (OUTPATIENT)
Dept: ORTHOPEDICS CLINIC | Facility: CLINIC | Age: 88
End: 2023-03-02

## 2023-03-02 ENCOUNTER — HOSPITAL ENCOUNTER (OUTPATIENT)
Dept: GENERAL RADIOLOGY | Facility: HOSPITAL | Age: 88
Discharge: HOME | End: 2023-03-02
Attending: ORTHOPAEDIC SURGERY
Payer: MEDICARE

## 2023-03-02 VITALS — HEART RATE: 69 BPM | DIASTOLIC BLOOD PRESSURE: 73 MMHG | SYSTOLIC BLOOD PRESSURE: 148 MMHG

## 2023-03-02 DIAGNOSIS — Z47.89 ORTHOPEDIC AFTERCARE: ICD-10-CM

## 2023-03-02 DIAGNOSIS — M17.0 PRIMARY OSTEOARTHRITIS OF BOTH KNEES: Primary | ICD-10-CM

## 2023-03-02 PROCEDURE — 1125F AMNT PAIN NOTED PAIN PRSNT: CPT | Performed by: ORTHOPAEDIC SURGERY

## 2023-03-02 PROCEDURE — 3077F SYST BP >= 140 MM HG: CPT | Performed by: ORTHOPAEDIC SURGERY

## 2023-03-02 PROCEDURE — 20610 DRAIN/INJ JOINT/BURSA W/O US: CPT | Performed by: ORTHOPAEDIC SURGERY

## 2023-03-02 PROCEDURE — 99213 OFFICE O/P EST LOW 20 MIN: CPT | Performed by: ORTHOPAEDIC SURGERY

## 2023-03-02 PROCEDURE — 3078F DIAST BP <80 MM HG: CPT | Performed by: ORTHOPAEDIC SURGERY

## 2023-03-02 PROCEDURE — 73564 X-RAY EXAM KNEE 4 OR MORE: CPT | Performed by: ORTHOPAEDIC SURGERY

## 2023-03-02 RX ORDER — TRIAMCINOLONE ACETONIDE 40 MG/ML
40 INJECTION, SUSPENSION INTRA-ARTICULAR; INTRAMUSCULAR
Status: COMPLETED | OUTPATIENT
Start: 2023-03-02 | End: 2023-03-02

## 2023-03-02 RX ADMIN — TRIAMCINOLONE ACETONIDE 40 MG: 40 INJECTION, SUSPENSION INTRA-ARTICULAR; INTRAMUSCULAR at 16:56:00

## 2023-03-02 RX ADMIN — TRIAMCINOLONE ACETONIDE 40 MG: 40 INJECTION, SUSPENSION INTRA-ARTICULAR; INTRAMUSCULAR at 16:57:00

## 2023-03-02 NOTE — PROGRESS NOTES
Per verbal order from Dr. Yulissa Rocha draw up 3ml of 0.5% Marcaine & 2ml 1% lidocaine and 1ml of Kenalog 40 for cortisone injection to bilateral knee. Lupe Dietz    Patient provided education handout for cortisone injection.

## 2023-04-12 ENCOUNTER — LAB ENCOUNTER (OUTPATIENT)
Dept: LAB | Facility: HOSPITAL | Age: 88
End: 2023-04-12
Attending: Other
Payer: MEDICARE

## 2023-04-12 DIAGNOSIS — E51.9 THIAMINE DEFICIENCY: ICD-10-CM

## 2023-04-12 PROCEDURE — 84425 ASSAY OF VITAMIN B-1: CPT

## 2023-04-12 PROCEDURE — 36415 COLL VENOUS BLD VENIPUNCTURE: CPT

## 2023-04-14 LAB — VITAMIN B1 WHOLE BLD: 94.3 NMOL/L

## 2023-06-07 ENCOUNTER — OFFICE VISIT (OUTPATIENT)
Dept: ORTHOPEDICS CLINIC | Facility: CLINIC | Age: 88
End: 2023-06-07

## 2023-06-07 VITALS — DIASTOLIC BLOOD PRESSURE: 79 MMHG | HEART RATE: 71 BPM | SYSTOLIC BLOOD PRESSURE: 137 MMHG

## 2023-06-07 DIAGNOSIS — M17.0 PRIMARY OSTEOARTHRITIS OF BOTH KNEES: Primary | ICD-10-CM

## 2023-06-07 PROCEDURE — 1160F RVW MEDS BY RX/DR IN RCRD: CPT

## 2023-06-07 PROCEDURE — 1159F MED LIST DOCD IN RCRD: CPT

## 2023-06-07 PROCEDURE — 20610 DRAIN/INJ JOINT/BURSA W/O US: CPT

## 2023-06-07 PROCEDURE — 3075F SYST BP GE 130 - 139MM HG: CPT

## 2023-06-07 PROCEDURE — 3078F DIAST BP <80 MM HG: CPT

## 2023-06-07 PROCEDURE — 1125F AMNT PAIN NOTED PAIN PRSNT: CPT

## 2023-06-07 RX ORDER — TRIAMCINOLONE ACETONIDE 40 MG/ML
40 INJECTION, SUSPENSION INTRA-ARTICULAR; INTRAMUSCULAR ONCE
Status: COMPLETED | OUTPATIENT
Start: 2023-06-07 | End: 2023-06-07

## 2023-12-05 ENCOUNTER — TELEPHONE (OUTPATIENT)
Dept: ORTHOPEDICS CLINIC | Facility: CLINIC | Age: 88
End: 2023-12-05

## 2023-12-05 NOTE — TELEPHONE ENCOUNTER
S/w daughter and she states that she was sent referral from her mother's facility. I see the referral in our media tabs. I asked if he mother wanted a gel injection or cortisone injection. She states that he mother has received cortisone injections in the past and they have helped. I stated that we don't need prior auth for cortisone injections.  No other questions at this time

## 2023-12-05 NOTE — TELEPHONE ENCOUNTER
Patients states that the patient is supposed to be getting a bilateral knee injection, and she wants to be sure that the referral sent to our office from outside facility has all required informed and that it is acceptable?

## 2023-12-06 ENCOUNTER — PATIENT MESSAGE (OUTPATIENT)
Dept: INTERNAL MEDICINE CLINIC | Facility: CLINIC | Age: 88
End: 2023-12-06

## 2023-12-07 NOTE — TELEPHONE ENCOUNTER
Future Appointments   Date Time Provider Yong Keila   12/19/2023  2:40 PM Ashok White MD [facility] [place]   12/27/2023  2:00 PM Julian Fontenot MD [facility]   2/13/2024 12:30 PM Kaushik Peña DO [facility] [address] [place] message sent to patient in response to g4interactive message received--->see message.

## 2023-12-19 ENCOUNTER — LAB ENCOUNTER (OUTPATIENT)
Dept: LAB | Age: 88
End: 2023-12-19
Attending: INTERNAL MEDICINE
Payer: MEDICARE

## 2023-12-19 DIAGNOSIS — R63.4 WEIGHT LOSS: ICD-10-CM

## 2023-12-19 DIAGNOSIS — I10 PRIMARY HYPERTENSION: ICD-10-CM

## 2023-12-19 LAB
ALBUMIN SERPL-MCNC: 4 G/DL (ref 3.2–4.8)
ALBUMIN/GLOB SERPL: 1.9 {RATIO} (ref 1–2)
ALP LIVER SERPL-CCNC: 80 U/L
ALT SERPL-CCNC: 9 U/L
ANION GAP SERPL CALC-SCNC: 11 MMOL/L (ref 0–18)
AST SERPL-CCNC: 15 U/L (ref ?–34)
BASOPHILS # BLD AUTO: 0.05 X10(3) UL (ref 0–0.2)
BASOPHILS NFR BLD AUTO: 0.6 %
BILIRUB SERPL-MCNC: 0.5 MG/DL (ref 0.2–1.1)
BUN BLD-MCNC: 30 MG/DL (ref 9–23)
BUN/CREAT SERPL: 40 (ref 10–20)
CALCIUM BLD-MCNC: 9.3 MG/DL (ref 8.7–10.4)
CHLORIDE SERPL-SCNC: 94 MMOL/L (ref 98–112)
CO2 SERPL-SCNC: 30 MMOL/L (ref 21–32)
CREAT BLD-MCNC: 0.75 MG/DL
DEPRECATED RDW RBC AUTO: 44.9 FL (ref 35.1–46.3)
EGFRCR SERPLBLD CKD-EPI 2021: 76 ML/MIN/1.73M2 (ref 60–?)
EOSINOPHIL # BLD AUTO: 0.2 X10(3) UL (ref 0–0.7)
EOSINOPHIL NFR BLD AUTO: 2.3 %
ERYTHROCYTE [DISTWIDTH] IN BLOOD BY AUTOMATED COUNT: 13.8 % (ref 11–15)
FASTING STATUS PATIENT QL REPORTED: NO
GLOBULIN PLAS-MCNC: 2.1 G/DL (ref 2.8–4.4)
GLUCOSE BLD-MCNC: 97 MG/DL (ref 70–99)
HCT VFR BLD AUTO: 38.2 %
HGB BLD-MCNC: 12.5 G/DL
IMM GRANULOCYTES # BLD AUTO: 0.02 X10(3) UL (ref 0–1)
IMM GRANULOCYTES NFR BLD: 0.2 %
LYMPHOCYTES # BLD AUTO: 3.73 X10(3) UL (ref 1–4)
LYMPHOCYTES NFR BLD AUTO: 42.2 %
MCH RBC QN AUTO: 29.1 PG (ref 26–34)
MCHC RBC AUTO-ENTMCNC: 32.7 G/DL (ref 31–37)
MCV RBC AUTO: 88.8 FL
MONOCYTES # BLD AUTO: 0.5 X10(3) UL (ref 0.1–1)
MONOCYTES NFR BLD AUTO: 5.7 %
NEUTROPHILS # BLD AUTO: 4.34 X10 (3) UL (ref 1.5–7.7)
NEUTROPHILS # BLD AUTO: 4.34 X10(3) UL (ref 1.5–7.7)
NEUTROPHILS NFR BLD AUTO: 49 %
OSMOLALITY SERPL CALC.SUM OF ELEC: 286 MOSM/KG (ref 275–295)
PLATELET # BLD AUTO: 209 10(3)UL (ref 150–450)
POTASSIUM SERPL-SCNC: 4.1 MMOL/L (ref 3.5–5.1)
PROT SERPL-MCNC: 6.1 G/DL (ref 5.7–8.2)
RBC # BLD AUTO: 4.3 X10(6)UL
SODIUM SERPL-SCNC: 135 MMOL/L (ref 136–145)
TSI SER-ACNC: 1.5 MIU/ML (ref 0.55–4.78)
WBC # BLD AUTO: 8.8 X10(3) UL (ref 4–11)

## 2023-12-19 PROCEDURE — 85025 COMPLETE CBC W/AUTO DIFF WBC: CPT

## 2023-12-19 PROCEDURE — 36415 COLL VENOUS BLD VENIPUNCTURE: CPT

## 2023-12-19 PROCEDURE — 84443 ASSAY THYROID STIM HORMONE: CPT

## 2023-12-19 PROCEDURE — 80053 COMPREHEN METABOLIC PANEL: CPT

## 2023-12-24 PROBLEM — I70.0 ATHEROSCLEROSIS OF AORTA (HCC): Status: ACTIVE | Noted: 2023-12-24

## 2023-12-24 PROBLEM — L98.429 SKIN ULCER OF SACRUM (HCC): Status: RESOLVED | Noted: 2022-09-13 | Resolved: 2023-12-24

## 2023-12-24 PROBLEM — I70.0 ATHEROSCLEROSIS OF AORTA: Status: ACTIVE | Noted: 2023-12-24

## 2023-12-27 ENCOUNTER — OFFICE VISIT (OUTPATIENT)
Dept: ORTHOPEDICS CLINIC | Facility: CLINIC | Age: 88
End: 2023-12-27
Payer: COMMERCIAL

## 2023-12-27 VITALS — DIASTOLIC BLOOD PRESSURE: 73 MMHG | SYSTOLIC BLOOD PRESSURE: 121 MMHG | HEART RATE: 87 BPM

## 2023-12-27 DIAGNOSIS — M17.0 PRIMARY OSTEOARTHRITIS OF BOTH KNEES: Primary | ICD-10-CM

## 2023-12-27 PROCEDURE — 1160F RVW MEDS BY RX/DR IN RCRD: CPT | Performed by: ORTHOPAEDIC SURGERY

## 2023-12-27 PROCEDURE — 99213 OFFICE O/P EST LOW 20 MIN: CPT | Performed by: ORTHOPAEDIC SURGERY

## 2023-12-27 PROCEDURE — 1159F MED LIST DOCD IN RCRD: CPT | Performed by: ORTHOPAEDIC SURGERY

## 2023-12-27 PROCEDURE — 20610 DRAIN/INJ JOINT/BURSA W/O US: CPT | Performed by: ORTHOPAEDIC SURGERY

## 2023-12-27 PROCEDURE — 3074F SYST BP LT 130 MM HG: CPT | Performed by: ORTHOPAEDIC SURGERY

## 2023-12-27 PROCEDURE — 3078F DIAST BP <80 MM HG: CPT | Performed by: ORTHOPAEDIC SURGERY

## 2023-12-27 RX ORDER — TRIAMCINOLONE ACETONIDE 40 MG/ML
40 INJECTION, SUSPENSION INTRA-ARTICULAR; INTRAMUSCULAR
Status: COMPLETED | OUTPATIENT
Start: 2023-12-27 | End: 2023-12-28

## 2023-12-27 NOTE — PROGRESS NOTES
NURSING INTAKE COMMENTS:   Chief Complaint   Patient presents with    Knee Pain     Pt is requesting bilateral knee cortisone injections - Last given 03/02 - dull pain -  slight swelling        HPI: This 80year old female presents today with complaints of bilateral knee pain follow-up. She has had previous injections with good relief of symptoms. She requests repeat injections    Past Medical History:   Diagnosis Date    Essential hypertension      History reviewed. No pertinent surgical history. Current Outpatient Medications   Medication Sig Dispense Refill    POTASSIUM CHLORIDE 20 MEQ Oral Tab CR 1 TAB BY MOUTH TWICE DAILY (DO NOT CRUSH) 62 tablet 3    FUROSEMIDE 20 MG Oral Tab 1 TAB BY MOUTH DAILY 31 tablet 3    CARVEDILOL 6.25 MG Oral Tab THREE-HALF TABS (9.375 MG) BY MOUTH TWICE DAILY 93 tablet 3    MEMANTINE 10 MG Oral Tab 1 TAB BY MOUTH TWICE DAILY 62 tablet 3    cyanocobalamine 1000 MCG Oral Tab Take 1 tablet (1,000 mcg total) by mouth daily. acetaminophen 325 MG Oral Tab Take 2 tablets (650 mg total) by mouth every 6 (six) hours as needed for Fever. 1 tablet 0    loperamide 2 MG Oral Cap Take 1 capsule (2 mg total) by mouth 3 (three) times daily as needed for Diarrhea.  15 capsule 0    QUETIAPINE 25 MG Oral Tab ONE-HALF TAB (12.5 MG) BY MOUTH DAILY PRN (AGITATION) 15 tablet 0    melatonin 3 MG Oral Tab nightly 30 tablet 0     Allergies   Allergen Reactions    Other OTHER (SEE COMMENTS)     Gluten:  Reaction anemic       Family History   Problem Relation Age of Onset    Other (Alzheimer dementia) Brother        Social History     Occupational History    Not on file   Tobacco Use    Smoking status: Never    Smokeless tobacco: Never   Vaping Use    Vaping Use: Never used   Substance and Sexual Activity    Alcohol use: Not Currently     Comment: rare    Drug use: Never    Sexual activity: Not on file        Review of Systems:  GENERAL: denies fevers, chills, night sweats, fatigue, unintentional weight loss/gain  SKIN: denies skin lesions, open sores, rash  HEENT:denies recent vision change, new nasal congestion,hearing loss, tinnitus, sore throat, headaches  RESPIRATORY: denies new shortness of breath, cough, asthma, wheezing  CARDIOVASCULAR: denies chest pain, leg cramps with exertion, palpitations, leg swelling  GI: denies abdominal pain, nausea, vomiting, diarrhea, constipation, hematochezia, worsening heartburn or stomach ulcers  : denies dysuria, hematuria, incontinence, increased frequency, urgency, difficulty urinating  MUSCULOSKELETAL: denies musculoskeletal complaints other than in HPI  NEURO: denies numbness, tingling, weakness, balance issues, dizziness, memory loss  PSYCHIATRIC: denies Hx of depression, anxiety, other psychiatric disorders  HEMATOLOGIC: denies blood clots, anemia, blood clotting disorders, blood transfusion  ENDOCRINE: denies autoimmune disease, thyroid issues, or diabetes  ALLERGY: denies asthma, seasonal allergies    Physical Examination:    /73   Pulse 87   Constitutional: appears well hydrated, alert and responsive, no acute distress noted  Extremities: Bilateral knees with mild patellofemoral crepitus. Trace effusion. Neurological: Unchanged    Imaging:   No results found. Labs:  Lab Results   Component Value Date    WBC 8.8 12/19/2023    HGB 12.5 12/19/2023    .0 12/19/2023      Lab Results   Component Value Date    GLU 97 12/19/2023    BUN 30 (H) 12/19/2023    CREATSERUM 0.75 12/19/2023    GFRNAA 78 05/31/2022    GFRAA 90 05/31/2022        Assessment and Plan:  Diagnoses and all orders for this visit:    Primary osteoarthritis of both knees  -     arthrocentesis major joint  -     triamcinolone acetonide (Kenalog-40) 40 MG/ML injection 40 mg        Assessment: Bilateral knee osteoarthritis, primary    Plan: Using sterile technique and a superolateral parapatellar approach both knees were aspirated and injected with 40 mg of Kenalog.   From the right knee I was able to aspirate approximately 20 cc of clear yellow fluid. No fluid was aspirated from the left knee. I discussed activity modifications, home exercises, oral anti-inflammatory use, oral Tylenol use. Follow-up again as needed. Daughter was present for the visit today. Follow Up: Return if symptoms worsen or fail to improve.     Johnson Jolly MD

## 2023-12-28 PROCEDURE — 1160F RVW MEDS BY RX/DR IN RCRD: CPT | Performed by: ORTHOPAEDIC SURGERY

## 2023-12-28 PROCEDURE — 3074F SYST BP LT 130 MM HG: CPT | Performed by: ORTHOPAEDIC SURGERY

## 2023-12-28 PROCEDURE — 1159F MED LIST DOCD IN RCRD: CPT | Performed by: ORTHOPAEDIC SURGERY

## 2023-12-28 PROCEDURE — 3078F DIAST BP <80 MM HG: CPT | Performed by: ORTHOPAEDIC SURGERY

## 2023-12-28 RX ADMIN — TRIAMCINOLONE ACETONIDE 40 MG: 40 INJECTION, SUSPENSION INTRA-ARTICULAR; INTRAMUSCULAR at 08:22:00

## 2023-12-28 RX ADMIN — TRIAMCINOLONE ACETONIDE 40 MG: 40 INJECTION, SUSPENSION INTRA-ARTICULAR; INTRAMUSCULAR at 08:23:00

## 2023-12-28 NOTE — PROGRESS NOTES
Per verbal order from Dr. Ramesh Meneses draw up 3ml of 0.5% Marcaine & 2ml 1% lidocaine and 1ml of Kenalog 40 for cortisone injection to bilateral knee. Lion Prescott MA    Patient provided education handout for cortisone injection.

## 2024-02-19 RX ORDER — LEVOTHYROXINE SODIUM 0.03 MG/1
25 TABLET ORAL DAILY
Qty: 31 TABLET | Refills: 10 | OUTPATIENT
Start: 2024-02-19

## 2024-04-16 NOTE — TELEPHONE ENCOUNTER
Requested Prescriptions     Pending Prescriptions Disp Refills    MIRTAZAPINE 7.5 MG Oral Tab [Pharmacy Med Name: Mirtazapine 7.5 MG Tablet] 14 tablet 10     Si TAB BY MOUTH AT BEDTIME (DO NOT CRUSH)        LOV: 24  NOV: 24    Last refill/[place]MP: 24

## 2024-05-29 ENCOUNTER — OFFICE VISIT (OUTPATIENT)
Dept: INTERNAL MEDICINE CLINIC | Facility: CLINIC | Age: 89
End: 2024-05-29
Payer: COMMERCIAL

## 2024-05-29 VITALS
HEART RATE: 80 BPM | BODY MASS INDEX: 20 KG/M2 | WEIGHT: 113 LBS | SYSTOLIC BLOOD PRESSURE: 132 MMHG | DIASTOLIC BLOOD PRESSURE: 82 MMHG | RESPIRATION RATE: 18 BRPM

## 2024-05-29 DIAGNOSIS — I10 ESSENTIAL HYPERTENSION: ICD-10-CM

## 2024-05-29 DIAGNOSIS — F01.50 VASCULAR DEMENTIA WITHOUT BEHAVIORAL DISTURBANCE (HCC): ICD-10-CM

## 2024-05-29 DIAGNOSIS — Z00.00 MEDICARE ANNUAL WELLNESS VISIT, SUBSEQUENT: Primary | ICD-10-CM

## 2024-05-29 DIAGNOSIS — G31.9 DIFFUSE BRAIN ATROPHY (HCC): ICD-10-CM

## 2024-05-29 DIAGNOSIS — F32.4 MAJOR DEPRESSIVE DISORDER WITH SINGLE EPISODE, IN PARTIAL REMISSION (HCC): ICD-10-CM

## 2024-05-29 DIAGNOSIS — I70.0 ATHEROSCLEROSIS OF AORTA (HCC): ICD-10-CM

## 2024-05-29 PROCEDURE — 96160 PT-FOCUSED HLTH RISK ASSMT: CPT | Performed by: INTERNAL MEDICINE

## 2024-05-29 PROCEDURE — 3079F DIAST BP 80-89 MM HG: CPT | Performed by: INTERNAL MEDICINE

## 2024-05-29 PROCEDURE — 3075F SYST BP GE 130 - 139MM HG: CPT | Performed by: INTERNAL MEDICINE

## 2024-05-29 PROCEDURE — G0439 PPPS, SUBSEQ VISIT: HCPCS | Performed by: INTERNAL MEDICINE

## 2024-05-29 PROCEDURE — 1159F MED LIST DOCD IN RCRD: CPT | Performed by: INTERNAL MEDICINE

## 2024-05-29 PROCEDURE — 1126F AMNT PAIN NOTED NONE PRSNT: CPT | Performed by: INTERNAL MEDICINE

## 2024-05-29 PROCEDURE — 1170F FXNL STATUS ASSESSED: CPT | Performed by: INTERNAL MEDICINE

## 2024-05-29 NOTE — PROGRESS NOTES
Subjective:   Nohelia Sanchez is a 89 year old female who presents for a MA (Medicare Advantage) Supervisit (Once per calendar year) and scheduled follow up of multiple significant but stable problems.     Patient is here accompanied by her daughter, daughter is the main historian, patient resides in assisted living facility.  Daughter reports that patient eats very little, mainly consumes Ensure complete 2 to 3 cans a day and supplements with a little food.  She is sleeping better and appetite improved since she was started on mirtazapine 7.5 mg at bedtime by neurology.  Patient states that she plays bingo at the facility, tries to go for a walk every day with her walker.  Patient is being treated for dementia.    History/Other:   Fall Risk Assessment:   She has been screened for Falls and is High Risk. Fall Prevention information provided to patient in After Visit Summary.    Do you feel unsteady when standing or walking?: Yes  Do you worry about falling?: Yes  Have you fallen in the past year?: Yes  How many times have you fallen?: (P) 3  Were you injured?: (P) Yes     Cognitive Assessment:   Abnormal  What day of the week is this?: Incorrect  What month is it?: Correct  What year is it?: Correct  Recall \"Ball\": Incorrect  Recall \"Flag\": Incorrect  Recall \"Tree\": Incorrect    Functional Ability/Status:   Nohelia Sanchez has some abnormal functions as listed below:  She has Dressing and/or Bathing issues based on screening of functional status.  Difficulty dressing or bathing?: No  Bathing or Showering: Need some help  Dressing: Able without help  She has Driving difficulties based on screening of functional status. She has Meal Preparation difficulties based on screening of functional status.She has difficulties Managing Money/Bills based on screening of functional status.She has difficulties Shopping for Groceries based on screening of functional status. She has difficulties Taking Meds as Rx'd based on screening  of functional status. She has Walking problems based on screening of functional status. She has problems with Daily Activities based on screening of functional status. She has problems with Memory based on screening of functional status.       Depression Screening (PHQ-2/PHQ-9): PHQ-2 SCORE: 0  , done 5/29/2024        Advanced Directives:   She has a Living Will on file in [facility]; reviewed and discussed documents with patient (and family/surrogate if present).  She has a Power of  for Health Care on file in [facility].    Patient Active Problem List   Diagnosis    Essential hypertension    Vascular dementia without behavioral disturbance (HCC)    Diarrhea    Diarrhea, unspecified type    Atherosclerosis of aorta (HCC)     Allergies:  She is allergic to other.    Current Medications:  Outpatient Medications Marked as Taking for the 5/29/24 encounter (Office Visit) with Gladis Reyes MD   Medication Sig    mirtazapine 7.5 MG Oral Tab Take 1 tablet (7.5 mg total) by mouth nightly.    memantine 10 MG Oral Tab Take 1 tablet (10 mg total) by mouth 2 (two) times daily.    donepezil 10 MG Oral Tab Take 1 tablet daily.    POTASSIUM CHLORIDE 20 MEQ Oral Tab CR 1 TAB BY MOUTH TWICE DAILY (DO NOT CRUSH)    FUROSEMIDE 20 MG Oral Tab 1 TAB BY MOUTH DAILY    CARVEDILOL 6.25 MG Oral Tab THREE-HALF TABS (9.375 MG) BY MOUTH TWICE DAILY    cyanocobalamine 1000 MCG Oral Tab Take 1 tablet (1,000 mcg total) by mouth daily.    acetaminophen 325 MG Oral Tab Take 2 tablets (650 mg total) by mouth every 6 (six) hours as needed for Fever.    loperamide 2 MG Oral Cap Take 1 capsule (2 mg total) by mouth 3 (three) times daily as needed for Diarrhea.       Medical History:  She  has a past medical history of Essential hypertension.  Surgical History:  She  has no past surgical history on file.   Family History:  Her family history includes Alzheimer dementia in her brother.  Social History:  She  reports that she has never smoked. She has never  used smokeless tobacco. She reports that she does not currently use alcohol. She reports that she does not use drugs.    Tobacco:  She has never smoked tobacco.    CAGE Alcohol Screen:   CAGE screening score of 0 on 5/28/2024, showing low risk of alcohol abuse.      Patient Care Team:  Gladis Reyes MD as PCP - General (Internal Medicine)  Maryana Dillard DO (NEUROLOGY)    Review of Systems   Constitutional:  Positive for activity change and appetite change. Negative for fatigue and unexpected weight change.   HENT:  Negative for hearing loss, trouble swallowing and voice change.    Eyes:  Negative for visual disturbance.   Respiratory:  Negative for cough and shortness of breath.    Cardiovascular:  Negative for chest pain.   Gastrointestinal:  Negative for abdominal pain, nausea and vomiting.   Endocrine: Negative for polydipsia and polyphagia.   Genitourinary:  Negative for difficulty urinating.   Musculoskeletal:  Negative for back pain and gait problem.   Skin:  Negative for rash.   Neurological:  Negative for dizziness, tremors, speech difficulty and headaches.   Hematological:  Negative for adenopathy.   Psychiatric/Behavioral:  Positive for confusion. Negative for behavioral problems and sleep disturbance. The patient is not nervous/anxious.          /82 (BP Location: Left arm, Patient Position: Sitting, Cuff Size: adult)   Pulse 80   Resp 18   Wt 113 lb (51.3 kg)   BMI 20.02 kg/m²        /82 (BP Location: Left arm, Patient Position: Sitting, Cuff Size: adult)   Pulse 80   Resp 18   Wt 113 lb (51.3 kg)   BMI 20.02 kg/m²  Estimated body mass index is 20.02 kg/m² as calculated from the following:    Height as of 12/19/23: 5' 3\" (1.6 m).    Weight as of this encounter: 113 lb (51.3 kg).  Patient is alert, oriented x 1  Neck is supple no JVD or bruit  Chest is symmetrical  Heart S1-S2 regular no movement below.  Lungs are clear to auscultation no rales or wheezing.  Abdomen soft  nontender  Extremities shows no peripheral edema.  Cranial nerves grossly intact, no muscle strength is equal lower extremities    Medicare Hearing Assessment:   Hearing Screening    Time taken: 5/29/2024  2:42 PM  Entry User: Lauren Barrett MA  Screening Method: Finger Rub  Finger Rub Result: Pass         Visual Acuity:   Right Eye Visual Acuity: Corrected     Left Eye Visual Acuity: Corrected     Both Eyes Visual Acuity: Corrected Both Eyes Chart Acuity: 20/20   Able To Tolerate Visual Acuity: Yes        Assessment & Plan:   Nohelia Sanchez is a 89 year old female who presents for a Medicare Assessment.     ICD-10-CM    1. Medicare annual wellness visit, subsequent  Z00.00       2. Vascular dementia without behavioral disturbance (HCC) moderate, continue donepezil and memantine F01.50       3. Diffuse brain atrophy (HCC) no treatment available G31.9       4. Essential hypertension controlled I10       5. Atherosclerosis of aorta (HCC) stable no treatment needed I70.0    6.               Follow-up in 6 months    Gladis Reyes MD, 5/29/2024     Supplementary Documentation:   General Health:  In the past six months, have you lost more than 10 pounds without trying?: 1 - Yes  Has your appetite been poor?: Yes  Type of Diet: Other  How does the patient maintain a good energy level?: Other  How would you describe your daily physical activity?: None  How would you describe your current health state?: Fair  On a scale of 0 to 10, with 0 being no pain and 10 being severe pain, what is your pain level?: 1 - (Mild)  In the past six months, have you experienced urine leakage?: 1-Yes  At any time do you feel concerned for the safety/well-being of yourself and/or your children, in your home or elsewhere?: No  Have you had any immunizations at another office such as Influenza, Hepatitis B, Tetanus, or Pneumococcal?: No       Nohelia Sanchez's SCREENING SCHEDULE   Tests on this list are recommended by your physician but may not  be covered, or covered at this frequency, by your insurer.   Please check with your insurance carrier before scheduling to verify coverage.   PREVENTATIVE SERVICES FREQUENCY &  COVERAGE DETAILS LAST COMPLETION DATE   Diabetes Screening    Fasting Blood Sugar /  Glucose    One screening every 12 months if never tested or if previously tested but not diagnosed with pre-diabetes   One screening every 6 months if diagnosed with pre-diabetes Lab Results   Component Value Date    GLU 97 12/19/2023        Cardiovascular Disease Screening    Lipid Panel  Cholesterol  Lipoprotein (HDL)  Triglycerides Covered every 5 years for all Medicare beneficiaries without apparent signs or symptoms of cardiovascular disease No results found for: \"CHOLEST\", \"HDL\", \"LDL\", \"LDLD\", \"LDLC\", \"TRIG\"      Electrocardiogram (EKG)   Covered if needed at Welcome to Medicare, and non-screening if indicated for medical reasons 05/11/2022      Ultrasound Screening for Abdominal Aortic Aneurysm (AAA) Covered once in a lifetime for one of the following risk factors    Men who are 65-75 years old and have ever smoked    Anyone with a family history -     Colorectal Cancer Screening  Covered for ages 50-85; only need ONE of the following:    Colonoscopy   Covered every 10 years    Covered every 2 years if patient is at high risk or previous colonoscopy was abnormal -    No recommendations at this time    Flexible Sigmoidoscopy   Covered every 4 years -    Fecal Occult Blood Test Covered annually -   Bone Density Screening    Bone density screening    Covered every 2 years after age 65 if diagnosed with risk of osteoporosis or estrogen deficiency.    Covered yearly for long-term glucocorticoid medication use (Steroids) No results found for this or any previous visit.      No recommendations at this time   Pap and Pelvic    Pap   Covered every 2 years for women at normal risk; Annually if at high risk -  No recommendations at this time    Chlamydia  Annually if high risk -  No recommendations at this time   Screening Mammogram    Mammogram     Recommend annually for all female patients aged 40 and older    One baseline mammogram covered for patients aged 35-39 -    No recommendations at this time    Immunizations    Influenza Covered once per flu season  Please get every year 11/07/2023  No recommendations at this time    Pneumococcal Each vaccine ([id number] & [id number]) covered once after 65 Prevnar 13: 09/26/2019    Eziyualts71: 11/12/2015     No recommendations at this time    Hepatitis B One screening covered for patients with certain risk factors   -  No recommendations at this time    Tetanus Toxoid Not covered by Medicare Part B unless medically necessary (cut with metal); may be covered with your pharmacy prescription benefits -    Tetanus, Diptheria and Pertusis TD and TDaP Not covered by Medicare Part B -  No recommendations at this time    Zoster Not covered by Medicare Part B; may be covered with your pharmacy  prescription benefits -  Zoster Vaccines(1 of 2) Never done     Annual Monitoring of Persistent Medications (ACE/ARB, digoxin diuretics, anticonvulsants)    Potassium Annually Lab Results   Component Value Date    K 4.1 12/19/2023         Creatinine   Annually Lab Results   Component Value Date    CREATSERUM 0.75 12/19/2023         BUN Annually Lab Results   Component Value Date    BUN 30 (H) 12/19/2023       Drug Serum Conc Annually No results found for: \"DIGOXIN\", \"DIG\", \"VALP\"

## 2024-06-12 ENCOUNTER — OFFICE VISIT (OUTPATIENT)
Dept: NEUROLOGY | Facility: CLINIC | Age: 89
End: 2024-06-12
Payer: COMMERCIAL

## 2024-06-12 VITALS — WEIGHT: 113 LBS | BODY MASS INDEX: 20 KG/M2

## 2024-06-12 DIAGNOSIS — E51.9 THIAMINE DEFICIENCY: Primary | ICD-10-CM

## 2024-06-12 DIAGNOSIS — F01.50 VASCULAR DEMENTIA WITHOUT BEHAVIORAL DISTURBANCE (HCC): ICD-10-CM

## 2024-06-12 PROCEDURE — 99213 OFFICE O/P EST LOW 20 MIN: CPT | Performed by: OTHER

## 2024-06-12 PROCEDURE — 1160F RVW MEDS BY RX/DR IN RCRD: CPT | Performed by: OTHER

## 2024-06-12 PROCEDURE — 1159F MED LIST DOCD IN RCRD: CPT | Performed by: OTHER

## 2024-06-12 RX ORDER — MEMANTINE HYDROCHLORIDE 10 MG/1
10 TABLET ORAL 2 TIMES DAILY
Qty: 180 TABLET | Refills: 3 | Status: SHIPPED | OUTPATIENT
Start: 2024-06-12 | End: 2025-06-07

## 2024-06-12 RX ORDER — LEVOTHYROXINE SODIUM 0.03 MG/1
TABLET ORAL
COMMUNITY
Start: 2024-05-18

## 2024-06-12 RX ORDER — MIRTAZAPINE 7.5 MG/1
7.5 TABLET, FILM COATED ORAL NIGHTLY
Qty: 90 TABLET | Refills: 3 | Status: SHIPPED | OUTPATIENT
Start: 2024-06-12 | End: 2025-06-07

## 2024-06-12 RX ORDER — DONEPEZIL HYDROCHLORIDE 10 MG/1
TABLET, FILM COATED ORAL
Qty: 270 TABLET | Refills: 3 | Status: SHIPPED | OUTPATIENT
Start: 2024-06-12

## 2024-06-12 NOTE — PROGRESS NOTES
[facility] [address], [address]  [facility] [address]  [phone]          Established  Follow Up Visit       Date: June 12, 2024  Patient Name: Nohelia Sanchez   MRN: [id number]  Primary physician: [address] [place] [address]    Interval History:   -- The patient noted improvement in her sleep and appetite with Remeron.  More awake during day.  Gained 2 lb.  Food tasting good.  Memory is stable.  Mood is good.      -- \"Pretty good\" overall quality of life.        OUTPATIENT MEDICATIONS  Current Outpatient Medications on File Prior to Visit   Medication Sig Dispense Refill    levothyroxine 25 MCG Oral Tab       mirtazapine 7.5 MG Oral Tab Take 1 tablet (7.5 mg total) by mouth nightly. 90 tablet 0    memantine 10 MG Oral Tab Take 1 tablet (10 mg total) by mouth 2 (two) times daily. 180 tablet 3    donepezil 10 MG Oral Tab Take 1 tablet daily. 270 tablet 3    POTASSIUM CHLORIDE 20 MEQ Oral Tab CR 1 TAB BY MOUTH TWICE DAILY (DO NOT CRUSH) 62 tablet 3    FUROSEMIDE 20 MG Oral Tab 1 TAB BY MOUTH DAILY 31 tablet 3    CARVEDILOL 6.25 MG Oral Tab THREE-HALF TABS (9.375 MG) BY MOUTH TWICE DAILY 93 tablet 3    cyanocobalamine 1000 MCG Oral Tab Take 1 tablet (1,000 mcg total) by mouth daily.      acetaminophen 325 MG Oral Tab Take 2 tablets (650 mg total) by mouth every 6 (six) hours as needed for Fever. 1 tablet 0    loperamide 2 MG Oral Cap Take 1 capsule (2 mg total) by mouth 3 (three) times daily as needed for Diarrhea. 15 capsule 0     No current facility-administered medications on file prior to visit.         PHYSICAL EXAM:   Wt 113 lb (51.3 kg)   BMI 20.02 kg/m²   General appearance: Well appearing, and in no acute distress  Skin: skin color, texture normal.  No rashes or lesions.    Head: Normocephalic, atraumatic.    Neurological exam:    Mental Status:   Attention/Concentration: intact attention on bedside test   Fund of knowledge: reduced   Speech: no dysarthria or aphasia      Pleasant     LABS/DATA:  Component      Latest Ref Rn 12/19/2023   WBC      4.0 - 11.0 x10(3) uL 8.8    RBC      3.80 - 5.30 x10(6)uL 4.30    Hemoglobin      12.0 - 16.0 g/dL 12.5    Hematocrit      35.0 - 48.0 % 38.2    MCV      80.0 - 100.0 fL 88.8    MCH      26.0 - 34.0 pg 29.1    MCHC      31.0 - 37.0 g/dL 32.7    RDW-SD      35.1 - 46.3 fL 44.9    RDW      11.0 - 15.0 % 13.8    Platelet Count      150.0 - 450.0 10(3)uL 209.0    Prelim Neutrophil Abs      1.50 - 7.70 x10 (3) uL 4.34    Neutrophils Absolute      1.50 - 7.70 x10(3) uL 4.34    Lymphocytes Absolute      1.00 - 4.00 x10(3) uL 3.73    Monocytes Absolute      0.10 - 1.00 x10(3) uL 0.50    Eosinophils Absolute      0.00 - 0.70 x10(3) uL 0.20    Basophils Absolute      0.00 - 0.20 x10(3) uL 0.05    Immature Granulocyte Absolute      0.00 - 1.00 x10(3) uL 0.02    Neutrophils %      % 49.0    Lymphocytes %      % 42.2    Monocytes %      % 5.7    Eosinophils %      % 2.3    Basophils %      % 0.6    Immature Granulocyte %      % 0.2    Glucose      70 - 99 mg/dL 97    Sodium      136 - 145 mmol/L 135 (L)    Potassium      3.5 - 5.1 mmol/L 4.1    Chloride      98 - 112 mmol/L 94 (L)    Carbon Dioxide, Total      21.0 - 32.0 mmol/L 30.0    ANION GAP      0 - 18 mmol/L 11    BUN      9 - 23 mg/dL 30 (H)    CREATININE      0.55 - 1.02 mg/dL 0.75    BUN/CREATININE RATIO      10.0 - 20.0  40.0 (H)    CALCIUM      8.7 - 10.4 mg/dL 9.3    CALCULATED OSMOLALITY      275 - 295 mOsm/kg 286    EGFR      >=60 mL/min/1.73m2 76    ALT (SGPT)      10 - 49 U/L 9 (L)    AST (SGOT)      <=34 U/L 15    ALKALINE PHOSPHATASE      55 - 142 U/L 80    Total Bilirubin      0.2 - 1.1 mg/dL 0.5    PROTEIN, TOTAL      5.7 - 8.2 g/dL 6.1    Albumin      3.2 - 4.8 g/dL 4.0    Globulin      2.8 - 4.4 g/dL 2.1 (L)    A/G Ratio      1.0 - 2.0  1.9    Patient Fasting for CMP? No    TSH      0.550 - 4.780 mIU/mL 1.503       Legend:  (L) Low  (H)  High      IMAGING:  N/A    ASSESSMENT:  The patient is a 89 year old woman with past medical history of vascular dementia, hypertension, B12 deficiency presents for follow up of mixed dementia and thiamine deficiency.  She is having depressive symptoms with poor appetite.     Her neurological examination is significant for MoCA 13/30 with deficits in visual-spatial/executive, attention with serial sevens, language fluency, delayed recall, orientation.    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  She is doing well.    Thiamine deficiency likely contributing to cognitive decline, status post supplements which improved confusion   --Namenda 10 mg twice daily   --Recommend multivitamin, Ensure, vitamin D   -Aricept 10 mg daily   --Continue Mirtazepine   -No driving, continue living in memory care  -Future consideration:cognitive speech therapy, brain [facility]     Follow up: 6 months       Discussed indication, administration, dose, and side effects with patient of any medications personally prescribed. Patient was advised to let my office know if they have any questions or concerns.       Today, I personally spent 10 minutes in this case, including chart review, time spent with patient doing face to face evaluation w/ interview and exam and patient education, counseling, and time was spent in patient education, counseling, and coordination of care as described above.   Issues discussed: Diagnosis and implications on future health, benefits and side effects of  present and future medications, test results as well as further testing and medications required.    This note was prepared using Dragon Medical voice recognition dictation software and as a result, errors may occur. When identified, these errors have been corrected. While every attempt is made to correct errors during dictation, discrepancies may still exist    SHELTON Dillard DO   Staff Physician, Neurology   6/12/2024  11:00 AM

## 2024-06-20 ENCOUNTER — TELEPHONE (OUTPATIENT)
Dept: INTERNAL MEDICINE CLINIC | Facility: CLINIC | Age: 89
End: 2024-06-20

## 2024-06-20 NOTE — TELEPHONE ENCOUNTER
Spoke to daughter, advised that I cannot add patient tomorrow for appointment, from reviewing records from admission to the hospital, in my opinion patient needs to see cardiologist she may be developing cardiac arrhythmia or sick sinus syndrome which makes people faint.  I provided referral for the patient to see Dr. Paul at [facility] electrophysiologist.

## 2024-06-20 NOTE — TELEPHONE ENCOUNTER
Patient's daughter, Ba stated patient patient was discharged Monday from hospital Dx-fainted , was called today from the [facility], patient found on floor, not sure if she fell or fainted, no bruises, vitals are stable , no injuries noted     Daughter requesting appointment only with Dr Reyes tomorrow, no appointment available , decline with other Dr's , also asking if Dr can call her if need

## 2025-01-22 ENCOUNTER — TELEPHONE (OUTPATIENT)
Dept: INTERNAL MEDICINE CLINIC | Facility: CLINIC | Age: OVER 89
End: 2025-01-22

## 2025-03-06 ENCOUNTER — TELEPHONE (OUTPATIENT)
Dept: INTERNAL MEDICINE CLINIC | Facility: CLINIC | Age: OVER 89
End: 2025-03-06

## 2025-06-26 ENCOUNTER — TELEPHONE (OUTPATIENT)
Dept: INTERNAL MEDICINE CLINIC | Facility: CLINIC | Age: OVER 89
End: 2025-06-26

## 2025-06-26 NOTE — TELEPHONE ENCOUNTER
Patient is due for annual medicare wellness visit for 2025 with PCP or coordinating NP. This can be done anytime in the calendar year.    Spoke to patient's daughter, Ba, who declines to make [facility] appointment because patient sees a provider at the facility where she lives.

## (undated) NOTE — LETTER
AUTHORIZATION FOR SURGICAL OPERATION OR OTHER PROCEDURE    1. I hereby authorize Dr. Galen Alfaro PA-C, and [place] [facility] staff assigned to my case to perform the following operation and/or procedure at the [place] [facility]:    _______________________________________________________________________________________________    Bilateral knee, cortisone injecitons  _______________________________________________________________________________________________    2. My physician has explained the nature and purpose of the operation or other procedure, possible alternative methods of treatment, the risks involved, and the possibility of complication to me. I acknowledge that no guarantee has been made as to the result that may be obtained. 3.  I recognize that, during the course of this operation, or other procedure, unforseen conditions may necessitate additional or different procedure than those listed above. I, therefore, further authorize and request that the above named physician, his/her physician assistants or designees perform such procedures as are, in his/her professional opinion, necessary and desirable. 4.  Any tissue or organs removed in the operation or other procedure may be disposed of by and at the discretion of the [facility], [facility] and [facility]. 5.  I understand that in the event of a medical emergency, I will be transported by local paramedics to [facility] or other hospital emergency department. 6.  I certify that I have read and fully understand the above consent to operation and/or other procedure. 7.  I acknowledge that my physician has explained sedation/analgesia administration to me including the risks and benefits. I consent to the administration of sedation/analgesia as may be necessary or desirable in the judgement of my physician.     Witness signature: ___________________________________________________ Date: ______/______/_____                    Time:  ________ A. M.  P.M. Patient Name:  ______________________________________________________  (please print)      Patient signature:  ___________________________________________________             Relationship to Patient:           []  Parent    Responsible person                          []  Spouse  In case of minor or                    [] Other  _____________   Incompetent name:  __________________________________________________                               (please print)      _____________      Responsible person  In case of minor or  Incompetent signature:  _______________________________________________    Statement of Physician  My signature below affirms that prior to the time of the procedure, I have explained to the patient and/or his/her guardian, the risks and benefits involved in the proposed treatment and any reasonable alternative to the proposed treatment. I have also explained the risks and benefits involved in the refusal of the proposed treatment and have answered the patient's questions.                         Date:  ______/______/_______  Provider                      Signature:  __________________________________________________________       Time:  ___________ A.M    P.M.

## (undated) NOTE — LETTER
AUTHORIZATION FOR SURGICAL OPERATION OR OTHER PROCEDURE    1. I hereby authorize Dr. Kathia Morillo , and [place] [employer] [facility] staff assigned to my case to perform the following operation and/or procedure at the [facility], [facility]:    Cortisone injection in Bilateral knees   _______________________________________________________________________________________________      _______________________________________________________________________________________________    2. My physician has explained the nature and purpose of the operation or other procedure, possible alternative methods of treatment, the risks involved, and the possibility of complication to me. I acknowledge that no guarantee has been made as to the result that may be obtained. 3.  I recognize that, during the course of this operation, or other procedure, unforseen conditions may necessitate additional or different procedure than those listed above. I, therefore, further authorize and request that the above named physician, his/her physician assistants or designees perform such procedures as are, in his/her professional opinion, necessary and desirable. 4.  Any tissue or organs removed in the operation or other procedure may be disposed of by and at the discretion of the [place] [employer] [facility] and [address]. 5.  I understand that in the event of a medical emergency, I will be transported by local paramedics to [facility] or other [facility] emergency department. 6.  I certify that I have read and fully understand the above consent to operation and/or other procedure. 7.  I acknowledge that my physician has explained sedation/analgesia administration to me including the risks and benefits. I consent to the administration of sedation/analgesia as may be necessary or desirable in the judgement of my physician.     Witness signature: ___________________________________________________ Date: ______/______/_____                    Time:  ________ A. M.  P.M. Patient Name:  ______________________________________________________  (please print)      Patient signature:  ___________________________________________________             Relationship to Patient:           []  Parent    Responsible person                          []  Spouse  In case of minor or                    [] Other  _____________   Incompetent name:  __________________________________________________                               (please print)      _____________      Responsible person  In case of minor or  Incompetent signature:  _______________________________________________    Statement of Physician  My signature below affirms that prior to the time of the procedure, I have explained to the patient and/or his/her guardian, the risks and benefits involved in the proposed treatment and any reasonable alternative to the proposed treatment. I have also explained the risks and benefits involved in the refusal of the proposed treatment and have answered the patient's questions.                         Date:  ______/______/_______  Provider                      Signature:  __________________________________________________________       Time:  ___________ A.M    P.M.

## (undated) NOTE — LETTER
AUTHORIZATION FOR SURGICAL OPERATION OR OTHER PROCEDURE    1. I hereby authorize Dr. Willem Gonsalves and [facility] staff assigned to my case to perform the following operation and/or procedure at the [facility], [facility]:    _______________________________________________________________________________________________    Cortisone injection Bilateral Knee  _______________________________________________________________________________________________    2. My physician has explained the nature and purpose of the operation or other procedure, possible alternative methods of treatment, the risks involved, and the possibility of complication to me. I acknowledge that no guarantee has been made as to the result that may be obtained. 3.  I recognize that, during the course of this operation, or other procedure, unforseen conditions may necessitate additional or different procedure than those listed above. I, therefore, further authorize and request that the above named physician, his/her physician assistants or designees perform such procedures as are, in his/her professional opinion, necessary and desirable. 4.  Any tissue or organs removed in the operation or other procedure may be disposed of by and at the discretion of the [facility], [facility] and [facility] AT [facility]. 5.  I understand that in the event of a medical emergency, I will be transported by local paramedics to [facility] or other hospital emergency department. 6.  I certify that I have read and fully understand the above consent to operation and/or other procedure. 7.  I acknowledge that my physician has explained sedation/analgesia administration to me including the risks and benefits. I consent to the administration of sedation/analgesia as may be necessary or desirable in the judgement of my physician.     Witness signature: ___________________________________________________ Date:  ______/______/_____ Time:  ________ A. M.  P.M. Patient Name:  ______________________________________________________  (please print)      Patient signature:  ___________________________________________________             Relationship to Patient:           []  Parent    Responsible person                          []  Spouse  In case of minor or                    [] Other  _____________   Incompetent name:  __________________________________________________                               (please print)      _____________      Responsible person  In case of minor or  Incompetent signature:  _______________________________________________    Statement of Physician  My signature below affirms that prior to the time of the procedure, I have explained to the patient and/or his/her guardian, the risks and benefits involved in the proposed treatment and any reasonable alternative to the proposed treatment. I have also explained the risks and benefits involved in the refusal of the proposed treatment and have answered the patient's questions.                         Date:  ______/______/_______  Provider                      Signature:  __________________________________________________________       Time:  ___________ A.M    P.M.

## (undated) NOTE — LETTER
AUTHORIZATION FOR SURGICAL OPERATION OR OTHER PROCEDURE    1. I hereby authorize Dr. Mirtha Narvaez, and [facility], [facility] staff assigned to my case to perform the following operation and/or procedure at the [facility], [facility]:    Bilateral knee cortisone injection  _______________________________________________________________________________________________      _______________________________________________________________________________________________    2. My physician has explained the nature and purpose of the operation or other procedure, possible alternative methods of treatment, the risks involved, and the possibility of complication to me. I acknowledge that no guarantee has been made as to the result that may be obtained. 3.  I recognize that, during the course of this operation, or other procedure, unforseen conditions may necessitate additional or different procedure than those listed above. I, therefore, further authorize and request that the above named physician, his/her physician assistants or designees perform such procedures as are, in his/her professional opinion, necessary and desirable. 4.  Any tissue or organs removed in the operation or other procedure may be disposed of by and at the discretion of the [facility], [facility] and [facility] AT [facility]. 5.  I understand that in the event of a medical emergency, I will be transported by local paramedics to [facility] or other hospital emergency department. 6.  I certify that I have read and fully understand the above consent to operation and/or other procedure. 7.  I acknowledge that my physician has explained sedation/analgesia administration to me including the risks and benefits. I consent to the administration of sedation/analgesia as may be necessary or desirable in the judgement of my physician.     Witness signature: ___________________________________________________ Date:  ______/______/_____ Time:  ________ A. M.  P.M. Patient Name:  ______________________________________________________  (please print)      Patient signature:  ___________________________________________________             Relationship to Patient:           []  Parent    Responsible person                          []  Spouse  In case of minor or                    [] Other  _____________   Incompetent name:  __________________________________________________                               (please print)      _____________      Responsible person  In case of minor or  Incompetent signature:  _______________________________________________    Statement of Physician  My signature below affirms that prior to the time of the procedure, I have explained to the patient and/or his/her guardian, the risks and benefits involved in the proposed treatment and any reasonable alternative to the proposed treatment. I have also explained the risks and benefits involved in the refusal of the proposed treatment and have answered the patient's questions.                         Date:  ______/______/_______  Provider                      Signature:  __________________________________________________________       Time:  ___________ A.M    P.M.

## (undated) NOTE — LETTER
AUTHORIZATION FOR SURGICAL OPERATION OR OTHER PROCEDURE    1. I hereby authorize Dr. Fidel Wheat, and [place] [email or web address] [facility] staff assigned to my case to perform the following operation and/or procedure at the [facility]:    _______________________________________________________________________________________________    Cortisone injections, bilateral knees  _______________________________________________________________________________________________    2. My physician has explained the nature and purpose of the operation or other procedure, possible alternative methods of treatment, the risks involved, and the possibility of complication to me. I acknowledge that no guarantee has been made as to the result that may be obtained. 3.  I recognize that, during the course of this operation, or other procedure, unforseen conditions may necessitate additional or different procedure than those listed above. I, therefore, further authorize and request that the above named physician, his/her physician assistants or designees perform such procedures as are, in his/her professional opinion, necessary and desirable. 4.  Any tissue or organs removed in the operation or other procedure may be disposed of by and at the discretion of the [facility], [facility] and [facility] AT [facility]. 5.  I understand that in the event of a medical emergency, I will be transported by local paramedics to [facility] or other hospital emergency department. 6.  I certify that I have read and fully understand the above consent to operation and/or other procedure. 7.  I acknowledge that my physician has explained sedation/analgesia administration to me including the risks and benefits. I consent to the administration of sedation/analgesia as may be necessary or desirable in the judgement of my physician.     Witness signature: ___________________________________________________ Date:  ______/______/_____ Time:  ________ A. M.  P.M. Patient Name:  ______________________________________________________  (please print)      Patient signature:  ___________________________________________________             Relationship to Patient:           []  Parent    Responsible person                          []  Spouse  In case of minor or                    [] Other  _____________   Incompetent name:  __________________________________________________                               (please print)      _____________      Responsible person  In case of minor or  Incompetent signature:  _______________________________________________    Statement of Physician  My signature below affirms that prior to the time of the procedure, I have explained to the patient and/or his/her guardian, the risks and benefits involved in the proposed treatment and any reasonable alternative to the proposed treatment. I have also explained the risks and benefits involved in the refusal of the proposed treatment and have answered the patient's questions.                         Date:  ______/______/_______  Provider                      Signature:  __________________________________________________________       Time:  ___________ A.M    P.M.

## (undated) NOTE — LETTER
AUTHORIZATION FOR SURGICAL OPERATION OR OTHER PROCEDURE    1.  I hereby authorize Dr. Chyna Oliver, and [facility], [facility] staff assigned to my case to perform the following operation and/or procedure at the [facility], [facility]:    _________________________ Time:  ________ A. M.  P.M.        Patient Name:  ______________________________________________________  (please print)      Patient signature:  ___________________________________________________             Relationship to Patient:           []  Jarocho Brown